# Patient Record
Sex: MALE | Race: WHITE | NOT HISPANIC OR LATINO | Employment: OTHER | URBAN - METROPOLITAN AREA
[De-identification: names, ages, dates, MRNs, and addresses within clinical notes are randomized per-mention and may not be internally consistent; named-entity substitution may affect disease eponyms.]

---

## 2017-01-09 ENCOUNTER — HISTORICAL (OUTPATIENT)
Dept: RADIOLOGY | Facility: HOSPITAL | Age: 57
End: 2017-01-09

## 2017-02-17 ENCOUNTER — HISTORICAL (OUTPATIENT)
Dept: RADIOLOGY | Facility: HOSPITAL | Age: 57
End: 2017-02-17

## 2017-03-17 ENCOUNTER — HISTORICAL (OUTPATIENT)
Dept: RADIOLOGY | Facility: HOSPITAL | Age: 57
End: 2017-03-17

## 2018-02-02 ENCOUNTER — HISTORICAL (OUTPATIENT)
Dept: RADIOLOGY | Facility: HOSPITAL | Age: 58
End: 2018-02-02

## 2020-08-05 ENCOUNTER — HISTORICAL (OUTPATIENT)
Dept: RADIOLOGY | Facility: HOSPITAL | Age: 60
End: 2020-08-05

## 2021-09-29 ENCOUNTER — HISTORICAL (OUTPATIENT)
Dept: RADIOLOGY | Facility: HOSPITAL | Age: 61
End: 2021-09-29

## 2021-10-04 ENCOUNTER — HISTORICAL (OUTPATIENT)
Dept: RADIOLOGY | Facility: HOSPITAL | Age: 61
End: 2021-10-04

## 2021-10-04 LAB — POC CREATININE: 1.2 MG/DL (ref 0.6–1.3)

## 2021-11-24 ENCOUNTER — HISTORICAL (OUTPATIENT)
Dept: RADIOLOGY | Facility: HOSPITAL | Age: 61
End: 2021-11-24

## 2022-01-01 ENCOUNTER — HOSPITAL ENCOUNTER (OUTPATIENT)
Dept: RADIOLOGY | Facility: HOSPITAL | Age: 62
Discharge: HOME | End: 2022-08-30
Attending: RADIOLOGY
Payer: MEDICARE

## 2022-01-01 ENCOUNTER — INFUSION (OUTPATIENT)
Dept: INFUSION THERAPY | Facility: HOSPITAL | Age: 62
End: 2022-01-01
Payer: MEDICARE

## 2022-01-01 ENCOUNTER — OFFICE VISIT (OUTPATIENT)
Dept: HEMATOLOGY/ONCOLOGY | Facility: CLINIC | Age: 62
End: 2022-01-01
Payer: MEDICARE

## 2022-01-01 ENCOUNTER — DOCUMENTATION ONLY (OUTPATIENT)
Dept: HEMATOLOGY/ONCOLOGY | Facility: CLINIC | Age: 62
End: 2022-01-01

## 2022-01-01 ENCOUNTER — HOSPITAL ENCOUNTER (EMERGENCY)
Facility: HOSPITAL | Age: 62
Discharge: HOME | End: 2022-09-29
Attending: EMERGENCY MEDICINE
Payer: MEDICARE

## 2022-01-01 ENCOUNTER — HOSPITAL ENCOUNTER (OUTPATIENT)
Dept: RADIOLOGY | Facility: HOSPITAL | Age: 62
Discharge: HOME | End: 2022-11-15
Attending: INTERNAL MEDICINE
Payer: MEDICARE

## 2022-01-01 ENCOUNTER — INFUSION (OUTPATIENT)
Dept: INFUSION THERAPY | Facility: HOSPITAL | Age: 62
End: 2022-01-01
Attending: INTERNAL MEDICINE
Payer: MEDICARE

## 2022-01-01 ENCOUNTER — TELEPHONE (OUTPATIENT)
Dept: HEMATOLOGY/ONCOLOGY | Facility: CLINIC | Age: 62
End: 2022-01-01
Payer: MEDICARE

## 2022-01-01 ENCOUNTER — OFFICE VISIT (OUTPATIENT)
Dept: RADIATION THERAPY | Facility: HOSPITAL | Age: 62
End: 2022-01-01
Attending: RADIOLOGY
Payer: MEDICARE

## 2022-01-01 ENCOUNTER — HOSPITAL ENCOUNTER (OUTPATIENT)
Dept: RADIOLOGY | Facility: HOSPITAL | Age: 62
Discharge: HOME | End: 2022-08-30
Attending: INTERNAL MEDICINE
Payer: MEDICARE

## 2022-01-01 ENCOUNTER — HOSPITAL ENCOUNTER (EMERGENCY)
Facility: HOSPITAL | Age: 62
Discharge: HOME | End: 2022-11-10
Attending: INTERNAL MEDICINE
Payer: MEDICARE

## 2022-01-01 ENCOUNTER — TELEPHONE (OUTPATIENT)
Dept: INTERVENTIONAL RADIOLOGY/VASCULAR | Facility: HOSPITAL | Age: 62
End: 2022-01-01
Payer: MEDICARE

## 2022-01-01 VITALS
HEIGHT: 71 IN | HEART RATE: 53 BPM | BODY MASS INDEX: 27.34 KG/M2 | TEMPERATURE: 98 F | WEIGHT: 190.94 LBS | SYSTOLIC BLOOD PRESSURE: 123 MMHG | RESPIRATION RATE: 18 BRPM | BODY MASS INDEX: 26.48 KG/M2 | HEIGHT: 70 IN | SYSTOLIC BLOOD PRESSURE: 148 MMHG | TEMPERATURE: 97 F | OXYGEN SATURATION: 93 % | OXYGEN SATURATION: 94 % | DIASTOLIC BLOOD PRESSURE: 74 MMHG | DIASTOLIC BLOOD PRESSURE: 62 MMHG | RESPIRATION RATE: 18 BRPM | WEIGHT: 189.13 LBS | HEART RATE: 53 BPM

## 2022-01-01 VITALS
DIASTOLIC BLOOD PRESSURE: 67 MMHG | TEMPERATURE: 99 F | TEMPERATURE: 98 F | OXYGEN SATURATION: 95 % | WEIGHT: 182 LBS | HEART RATE: 58 BPM | RESPIRATION RATE: 18 BRPM | WEIGHT: 192 LBS | BODY MASS INDEX: 26.11 KG/M2 | SYSTOLIC BLOOD PRESSURE: 138 MMHG | DIASTOLIC BLOOD PRESSURE: 65 MMHG | HEIGHT: 71 IN | BODY MASS INDEX: 26.88 KG/M2 | SYSTOLIC BLOOD PRESSURE: 112 MMHG | OXYGEN SATURATION: 95 % | RESPIRATION RATE: 16 BRPM | HEART RATE: 58 BPM

## 2022-01-01 VITALS
DIASTOLIC BLOOD PRESSURE: 61 MMHG | TEMPERATURE: 98 F | OXYGEN SATURATION: 93 % | BODY MASS INDEX: 27.34 KG/M2 | SYSTOLIC BLOOD PRESSURE: 123 MMHG | RESPIRATION RATE: 18 BRPM | HEIGHT: 70 IN | WEIGHT: 190.94 LBS | HEART RATE: 55 BPM

## 2022-01-01 VITALS
WEIGHT: 187 LBS | RESPIRATION RATE: 18 BRPM | OXYGEN SATURATION: 96 % | BODY MASS INDEX: 26.77 KG/M2 | TEMPERATURE: 98 F | DIASTOLIC BLOOD PRESSURE: 60 MMHG | SYSTOLIC BLOOD PRESSURE: 107 MMHG | HEIGHT: 70 IN | HEART RATE: 67 BPM

## 2022-01-01 VITALS
HEIGHT: 70 IN | DIASTOLIC BLOOD PRESSURE: 77 MMHG | HEART RATE: 54 BPM | SYSTOLIC BLOOD PRESSURE: 173 MMHG | TEMPERATURE: 98 F | WEIGHT: 185.19 LBS | OXYGEN SATURATION: 97 % | BODY MASS INDEX: 26.51 KG/M2 | RESPIRATION RATE: 23 BRPM

## 2022-01-01 VITALS
BODY MASS INDEX: 26.65 KG/M2 | HEIGHT: 70 IN | OXYGEN SATURATION: 95 % | WEIGHT: 185.63 LBS | RESPIRATION RATE: 18 BRPM | DIASTOLIC BLOOD PRESSURE: 80 MMHG | OXYGEN SATURATION: 95 % | BODY MASS INDEX: 26.57 KG/M2 | HEART RATE: 65 BPM | SYSTOLIC BLOOD PRESSURE: 133 MMHG | SYSTOLIC BLOOD PRESSURE: 133 MMHG | HEART RATE: 65 BPM | TEMPERATURE: 98 F | DIASTOLIC BLOOD PRESSURE: 80 MMHG | WEIGHT: 185.69 LBS | RESPIRATION RATE: 18 BRPM

## 2022-01-01 VITALS
DIASTOLIC BLOOD PRESSURE: 79 MMHG | HEART RATE: 68 BPM | RESPIRATION RATE: 18 BRPM | TEMPERATURE: 98 F | BODY MASS INDEX: 25.83 KG/M2 | OXYGEN SATURATION: 96 % | DIASTOLIC BLOOD PRESSURE: 76 MMHG | RESPIRATION RATE: 16 BRPM | HEIGHT: 70 IN | WEIGHT: 187 LBS | OXYGEN SATURATION: 98 % | SYSTOLIC BLOOD PRESSURE: 154 MMHG | SYSTOLIC BLOOD PRESSURE: 122 MMHG | TEMPERATURE: 99 F | WEIGHT: 180 LBS | BODY MASS INDEX: 26.77 KG/M2 | HEART RATE: 73 BPM

## 2022-01-01 VITALS
BODY MASS INDEX: 26.94 KG/M2 | OXYGEN SATURATION: 99 % | SYSTOLIC BLOOD PRESSURE: 133 MMHG | WEIGHT: 188.19 LBS | OXYGEN SATURATION: 97 % | HEART RATE: 60 BPM | TEMPERATURE: 98 F | RESPIRATION RATE: 18 BRPM | RESPIRATION RATE: 18 BRPM | HEART RATE: 83 BPM | DIASTOLIC BLOOD PRESSURE: 74 MMHG | WEIGHT: 187 LBS | HEIGHT: 70 IN | TEMPERATURE: 98 F | DIASTOLIC BLOOD PRESSURE: 77 MMHG | SYSTOLIC BLOOD PRESSURE: 141 MMHG | HEIGHT: 70 IN | BODY MASS INDEX: 26.77 KG/M2

## 2022-01-01 VITALS
HEIGHT: 70 IN | HEART RATE: 81 BPM | DIASTOLIC BLOOD PRESSURE: 77 MMHG | DIASTOLIC BLOOD PRESSURE: 67 MMHG | OXYGEN SATURATION: 99 % | BODY MASS INDEX: 26.34 KG/M2 | RESPIRATION RATE: 18 BRPM | HEART RATE: 83 BPM | HEIGHT: 70 IN | SYSTOLIC BLOOD PRESSURE: 147 MMHG | BODY MASS INDEX: 26.94 KG/M2 | WEIGHT: 184 LBS | TEMPERATURE: 98 F | RESPIRATION RATE: 18 BRPM | SYSTOLIC BLOOD PRESSURE: 114 MMHG | WEIGHT: 188.19 LBS | OXYGEN SATURATION: 97 % | TEMPERATURE: 98 F

## 2022-01-01 VITALS
TEMPERATURE: 98 F | OXYGEN SATURATION: 96 % | WEIGHT: 183.56 LBS | HEIGHT: 70 IN | BODY MASS INDEX: 26.28 KG/M2 | SYSTOLIC BLOOD PRESSURE: 114 MMHG | HEART RATE: 60 BPM | RESPIRATION RATE: 20 BRPM | DIASTOLIC BLOOD PRESSURE: 71 MMHG

## 2022-01-01 VITALS
DIASTOLIC BLOOD PRESSURE: 62 MMHG | BODY MASS INDEX: 27.18 KG/M2 | HEART RATE: 59 BPM | HEIGHT: 70 IN | SYSTOLIC BLOOD PRESSURE: 147 MMHG | RESPIRATION RATE: 18 BRPM | TEMPERATURE: 97 F | OXYGEN SATURATION: 97 % | WEIGHT: 189.88 LBS

## 2022-01-01 VITALS
HEIGHT: 70 IN | SYSTOLIC BLOOD PRESSURE: 164 MMHG | WEIGHT: 175 LBS | HEART RATE: 77 BPM | RESPIRATION RATE: 20 BRPM | OXYGEN SATURATION: 94 % | DIASTOLIC BLOOD PRESSURE: 84 MMHG | TEMPERATURE: 98 F | BODY MASS INDEX: 25.05 KG/M2

## 2022-01-01 DIAGNOSIS — E11.621 TYPE 2 DIABETES MELLITUS WITH DIABETIC TOE ULCER: ICD-10-CM

## 2022-01-01 DIAGNOSIS — C34.91 ADENOCARCINOMA, LUNG, RIGHT: Primary | ICD-10-CM

## 2022-01-01 DIAGNOSIS — E83.42 HYPOMAGNESEMIA: Primary | ICD-10-CM

## 2022-01-01 DIAGNOSIS — Z51.11 ENCOUNTER FOR ANTINEOPLASTIC CHEMOTHERAPY: Primary | ICD-10-CM

## 2022-01-01 DIAGNOSIS — R55 SYNCOPE: ICD-10-CM

## 2022-01-01 DIAGNOSIS — E87.5 HYPERKALEMIA: Primary | ICD-10-CM

## 2022-01-01 DIAGNOSIS — C34.91 ADENOCARCINOMA, LUNG, RIGHT: ICD-10-CM

## 2022-01-01 DIAGNOSIS — R91.1 LUNG NODULE: ICD-10-CM

## 2022-01-01 DIAGNOSIS — R63.0 DECREASE IN APPETITE: ICD-10-CM

## 2022-01-01 DIAGNOSIS — C34.90 MALIGNANT NEOPLASM OF LUNG, UNSPECIFIED LATERALITY, UNSPECIFIED PART OF LUNG: ICD-10-CM

## 2022-01-01 DIAGNOSIS — J44.9 CHRONIC OBSTRUCTIVE PULMONARY DISEASE, UNSPECIFIED COPD TYPE: ICD-10-CM

## 2022-01-01 DIAGNOSIS — Z51.11 ENCOUNTER FOR ANTINEOPLASTIC CHEMOTHERAPY: ICD-10-CM

## 2022-01-01 DIAGNOSIS — Z86.79 HISTORY OF HYPERTENSION: ICD-10-CM

## 2022-01-01 DIAGNOSIS — E83.42 HYPOMAGNESEMIA: ICD-10-CM

## 2022-01-01 DIAGNOSIS — K13.79 MOUTH SORE SECONDARY TO CHEMOTHERAPY: ICD-10-CM

## 2022-01-01 DIAGNOSIS — L97.509 TYPE 2 DIABETES MELLITUS WITH DIABETIC TOE ULCER: ICD-10-CM

## 2022-01-01 DIAGNOSIS — R63.4 ABNORMAL WEIGHT LOSS: ICD-10-CM

## 2022-01-01 DIAGNOSIS — S09.90XA TRAUMATIC INJURY OF HEAD, INITIAL ENCOUNTER: Primary | ICD-10-CM

## 2022-01-01 DIAGNOSIS — T45.1X5A MOUTH SORE SECONDARY TO CHEMOTHERAPY: ICD-10-CM

## 2022-01-01 DIAGNOSIS — R53.1 GENERALIZED WEAKNESS: Primary | ICD-10-CM

## 2022-01-01 DIAGNOSIS — E10.69 TYPE 1 DIABETES MELLITUS WITH OTHER SPECIFIED COMPLICATION: ICD-10-CM

## 2022-01-01 DIAGNOSIS — E87.5 HYPERKALEMIA: ICD-10-CM

## 2022-01-01 DIAGNOSIS — R63.4 ABNORMAL WEIGHT LOSS: Primary | ICD-10-CM

## 2022-01-01 DIAGNOSIS — R42 DIZZINESS AND GIDDINESS: ICD-10-CM

## 2022-01-01 LAB
ALBUMIN SERPL-MCNC: 4 GM/DL (ref 3.4–4.8)
ALBUMIN/GLOB SERPL: 1.5 RATIO (ref 1.1–2)
ALP SERPL-CCNC: 74 UNIT/L (ref 40–150)
ALT SERPL-CCNC: 11 UNIT/L (ref 0–55)
AMPHET UR QL SCN: NEGATIVE
APPEARANCE UR: CLEAR
AST SERPL-CCNC: 14 UNIT/L (ref 5–34)
BACTERIA #/AREA URNS AUTO: NORMAL /HPF
BARBITURATE SCN PRESENT UR: NEGATIVE
BASOPHILS # BLD AUTO: 0.11 X10(3)/MCL (ref 0–0.2)
BASOPHILS # BLD AUTO: 0.12 X10(3)/MCL (ref 0–0.2)
BASOPHILS NFR BLD AUTO: 1.1 %
BASOPHILS NFR BLD AUTO: 1.4 %
BENZODIAZ UR QL SCN: NEGATIVE
BILIRUB UR QL STRIP.AUTO: NEGATIVE MG/DL
BILIRUBIN DIRECT+TOT PNL SERPL-MCNC: 0.5 MG/DL
BUN SERPL-MCNC: 13 MG/DL (ref 8.4–25.7)
CALCIUM SERPL-MCNC: 9.1 MG/DL (ref 8.8–10)
CANNABINOIDS UR QL SCN: POSITIVE
CHLORIDE SERPL-SCNC: 103 MMOL/L (ref 98–107)
CO2 SERPL-SCNC: 31 MMOL/L (ref 23–31)
COCAINE UR QL SCN: NEGATIVE
COLOR UR AUTO: YELLOW
CREAT SERPL-MCNC: 1.46 MG/DL (ref 0.73–1.18)
DHEA SERPL-MCNC: NORMAL
EOSINOPHIL # BLD AUTO: 0.21 X10(3)/MCL (ref 0–0.9)
EOSINOPHIL # BLD AUTO: 0.3 X10(3)/MCL (ref 0–0.9)
EOSINOPHIL NFR BLD AUTO: 2.4 %
EOSINOPHIL NFR BLD AUTO: 2.9 %
ERYTHROCYTE [DISTWIDTH] IN BLOOD BY AUTOMATED COUNT: 13.4 % (ref 11.5–17)
ERYTHROCYTE [DISTWIDTH] IN BLOOD BY AUTOMATED COUNT: 13.5 % (ref 11.5–17)
ESTRIOL SERPL-MCNC: NORMAL NG/ML
ESTROGEN SERPL-MCNC: NORMAL PG/ML
ETHANOL SERPL-MCNC: <10 MG/DL
FENTANYL UR QL SCN: POSITIVE
FLUAV AG UPPER RESP QL IA.RAPID: NOT DETECTED
FLUBV AG UPPER RESP QL IA.RAPID: NOT DETECTED
GFR SERPLBLD CREATININE-BSD FMLA CKD-EPI: 53 MLS/MIN/1.73/M2
GLOBULIN SER-MCNC: 2.7 GM/DL (ref 2.4–3.5)
GLUCOSE SERPL-MCNC: 270 MG/DL (ref 70–110)
GLUCOSE SERPL-MCNC: 441 MG/DL (ref 82–115)
GLUCOSE UR QL STRIP.AUTO: NEGATIVE MG/DL
HAV IGM SERPL QL IA: NONREACTIVE
HBV CORE IGM SERPL QL IA: NONREACTIVE
HBV SURFACE AG SERPL QL IA: NONREACTIVE
HCT VFR BLD AUTO: 46.6 % (ref 42–52)
HCT VFR BLD AUTO: 50.4 % (ref 42–52)
HCV AB SERPL QL IA: NONREACTIVE
HGB BLD-MCNC: 14.4 GM/DL (ref 14–18)
HGB BLD-MCNC: 15.5 GM/DL (ref 14–18)
IMM GRANULOCYTES # BLD AUTO: 0.02 X10(3)/MCL (ref 0–0.04)
IMM GRANULOCYTES # BLD AUTO: 0.02 X10(3)/MCL (ref 0–0.04)
IMM GRANULOCYTES NFR BLD AUTO: 0.2 %
IMM GRANULOCYTES NFR BLD AUTO: 0.2 %
INR BLD: 1 (ref 0–1.3)
INSULIN SERPL-ACNC: NORMAL U[IU]/ML
KETONES UR QL STRIP.AUTO: NEGATIVE MG/DL
LAB AP CLINICAL INFORMATION: NORMAL
LAB AP GROSS DESCRIPTION: NORMAL
LAB AP REPORT FOOTNOTES: NORMAL
LEUKOCYTE ESTERASE UR QL STRIP.AUTO: NEGATIVE UNIT/L
LYMPHOCYTES # BLD AUTO: 2.74 X10(3)/MCL (ref 0.6–4.6)
LYMPHOCYTES # BLD AUTO: 3.27 X10(3)/MCL (ref 0.6–4.6)
LYMPHOCYTES NFR BLD AUTO: 26.3 %
LYMPHOCYTES NFR BLD AUTO: 37.3 %
MAGNESIUM SERPL-MCNC: 1.9 MG/DL (ref 1.6–2.6)
MCH RBC QN AUTO: 27.4 PG (ref 27–31)
MCH RBC QN AUTO: 27.5 PG (ref 27–31)
MCHC RBC AUTO-ENTMCNC: 30.8 MG/DL (ref 33–36)
MCHC RBC AUTO-ENTMCNC: 30.9 MG/DL (ref 33–36)
MCV RBC AUTO: 88.8 FL (ref 80–94)
MCV RBC AUTO: 89.4 FL (ref 80–94)
MDMA UR QL SCN: NEGATIVE
MONOCYTES # BLD AUTO: 0.75 X10(3)/MCL (ref 0.1–1.3)
MONOCYTES # BLD AUTO: 0.89 X10(3)/MCL (ref 0.1–1.3)
MONOCYTES NFR BLD AUTO: 8.6 %
MONOCYTES NFR BLD AUTO: 8.6 %
NEUTROPHILS # BLD AUTO: 4.4 X10(3)/MCL (ref 2.1–9.2)
NEUTROPHILS # BLD AUTO: 6.3 X10(3)/MCL (ref 2.1–9.2)
NEUTROPHILS NFR BLD AUTO: 50.1 %
NEUTROPHILS NFR BLD AUTO: 60.9 %
NITRITE UR QL STRIP.AUTO: NEGATIVE
NRBC BLD AUTO-RTO: 0 %
OPIATES UR QL SCN: POSITIVE
PATH REV: NORMAL
PCP UR QL: NEGATIVE
PH UR STRIP.AUTO: 6.5 [PH]
PH UR: 6.5 [PH] (ref 3–11)
PLATELET # BLD AUTO: 151 X10(3)/MCL (ref 130–400)
PLATELET # BLD AUTO: 220 X10(3)/MCL (ref 130–400)
PMV BLD AUTO: 11.3 FL (ref 7.4–10.4)
PMV BLD AUTO: 11.4 FL (ref 7.4–10.4)
POCT GLUCOSE: 232 MG/DL (ref 70–110)
POCT GLUCOSE: 270 MG/DL (ref 70–110)
POTASSIUM SERPL-SCNC: 5 MMOL/L (ref 3.5–5.1)
PROT SERPL-MCNC: 6.7 GM/DL (ref 5.8–7.6)
PROT UR QL STRIP.AUTO: NEGATIVE MG/DL
PROTHROMBIN TIME: 13.1 SECONDS (ref 12.5–14.5)
RBC # BLD AUTO: 5.25 X10(6)/MCL (ref 4.7–6.1)
RBC # BLD AUTO: 5.64 X10(6)/MCL (ref 4.7–6.1)
RBC #/AREA URNS AUTO: NORMAL /HPF
RBC UR QL AUTO: ABNORMAL UNIT/L
SARS-COV-2 RNA RESP QL NAA+PROBE: NOT DETECTED
SODIUM SERPL-SCNC: 137 MMOL/L (ref 136–145)
SP GR UR STRIP.AUTO: 1.01
SPECIFIC GRAVITY, URINE AUTO (.000) (OHS): 1.01 (ref 1–1.03)
SQUAMOUS #/AREA URNS AUTO: NORMAL /HPF
T3RU NFR SERPL: NORMAL %
TROPONIN I SERPL-MCNC: 0.01 NG/ML (ref 0–0.04)
TSH SERPL-ACNC: 0.67 UIU/ML (ref 0.35–4.94)
UROBILINOGEN UR STRIP-ACNC: 1 MG/DL
WBC # SPEC AUTO: 10.4 X10(3)/MCL (ref 4.5–11.5)
WBC # SPEC AUTO: 8.8 X10(3)/MCL (ref 4.5–11.5)
WBC #/AREA URNS AUTO: NORMAL /HPF

## 2022-01-01 PROCEDURE — 99999 PR PBB SHADOW E&M-EST. PATIENT-LVL V: ICD-10-PCS | Mod: PBBFAC,,, | Performed by: INTERNAL MEDICINE

## 2022-01-01 PROCEDURE — 85610 PROTHROMBIN TIME: CPT | Performed by: RADIOLOGY

## 2022-01-01 PROCEDURE — 99999 PR PBB SHADOW E&M-EST. PATIENT-LVL V: CPT | Mod: PBBFAC,,, | Performed by: INTERNAL MEDICINE

## 2022-01-01 PROCEDURE — 99999 PR PBB SHADOW E&M-EST. PATIENT-LVL V: ICD-10-PCS | Mod: PBBFAC,,, | Performed by: NURSE PRACTITIONER

## 2022-01-01 PROCEDURE — 25000003 PHARM REV CODE 250: Performed by: EMERGENCY MEDICINE

## 2022-01-01 PROCEDURE — 99214 OFFICE O/P EST MOD 30 MIN: CPT | Mod: S$PBB,,, | Performed by: NURSE PRACTITIONER

## 2022-01-01 PROCEDURE — 77386 HC IMRT, COMPLEX: CPT | Performed by: RADIOLOGY

## 2022-01-01 PROCEDURE — 99215 PR OFFICE/OUTPT VISIT, EST, LEVL V, 40-54 MIN: ICD-10-PCS | Mod: S$PBB,,, | Performed by: INTERNAL MEDICINE

## 2022-01-01 PROCEDURE — 99215 OFFICE O/P EST HI 40 MIN: CPT | Mod: PBBFAC | Performed by: INTERNAL MEDICINE

## 2022-01-01 PROCEDURE — 99215 OFFICE O/P EST HI 40 MIN: CPT | Mod: S$PBB,,, | Performed by: INTERNAL MEDICINE

## 2022-01-01 PROCEDURE — 96368 THER/DIAG CONCURRENT INF: CPT

## 2022-01-01 PROCEDURE — 63600175 PHARM REV CODE 636 W HCPCS: Performed by: INTERNAL MEDICINE

## 2022-01-01 PROCEDURE — 99285 EMERGENCY DEPT VISIT HI MDM: CPT | Mod: 25,27

## 2022-01-01 PROCEDURE — 96367 TX/PROPH/DG ADDL SEQ IV INF: CPT

## 2022-01-01 PROCEDURE — 36415 COLL VENOUS BLD VENIPUNCTURE: CPT | Performed by: RADIOLOGY

## 2022-01-01 PROCEDURE — 96360 HYDRATION IV INFUSION INIT: CPT

## 2022-01-01 PROCEDURE — 99213 OFFICE O/P EST LOW 20 MIN: CPT | Mod: PBBFAC,25 | Performed by: INTERNAL MEDICINE

## 2022-01-01 PROCEDURE — 99214 OFFICE O/P EST MOD 30 MIN: CPT | Mod: S$PBB,,, | Performed by: INTERNAL MEDICINE

## 2022-01-01 PROCEDURE — 96375 TX/PRO/DX INJ NEW DRUG ADDON: CPT

## 2022-01-01 PROCEDURE — 77334 RADIATION TREATMENT AID(S): CPT | Performed by: RADIOLOGY

## 2022-01-01 PROCEDURE — 77300 RADIATION THERAPY DOSE PLAN: CPT | Performed by: RADIOLOGY

## 2022-01-01 PROCEDURE — 71045 X-RAY EXAM CHEST 1 VIEW: CPT | Mod: TC

## 2022-01-01 PROCEDURE — 99214 OFFICE O/P EST MOD 30 MIN: CPT | Mod: PBBFAC | Performed by: INTERNAL MEDICINE

## 2022-01-01 PROCEDURE — 77301 RADIOTHERAPY DOSE PLAN IMRT: CPT | Performed by: RADIOLOGY

## 2022-01-01 PROCEDURE — 96411 CHEMO IV PUSH ADDL DRUG: CPT

## 2022-01-01 PROCEDURE — 80074 ACUTE HEPATITIS PANEL: CPT | Performed by: INTERNAL MEDICINE

## 2022-01-01 PROCEDURE — 25000003 PHARM REV CODE 250: Performed by: RADIOLOGY

## 2022-01-01 PROCEDURE — 99214 PR OFFICE/OUTPT VISIT, EST, LEVL IV, 30-39 MIN: ICD-10-PCS | Mod: S$PBB,,, | Performed by: INTERNAL MEDICINE

## 2022-01-01 PROCEDURE — 99204 PR OFFICE/OUTPT VISIT, NEW, LEVL IV, 45-59 MIN: ICD-10-PCS | Mod: S$PBB,,, | Performed by: INTERNAL MEDICINE

## 2022-01-01 PROCEDURE — 99215 OFFICE O/P EST HI 40 MIN: CPT | Mod: PBBFAC,25 | Performed by: NURSE PRACTITIONER

## 2022-01-01 PROCEDURE — 83735 ASSAY OF MAGNESIUM: CPT | Performed by: INTERNAL MEDICINE

## 2022-01-01 PROCEDURE — 84484 ASSAY OF TROPONIN QUANT: CPT | Performed by: INTERNAL MEDICINE

## 2022-01-01 PROCEDURE — 25000003 PHARM REV CODE 250: Performed by: INTERNAL MEDICINE

## 2022-01-01 PROCEDURE — 96413 CHEMO IV INFUSION 1 HR: CPT

## 2022-01-01 PROCEDURE — 96374 THER/PROPH/DIAG INJ IV PUSH: CPT

## 2022-01-01 PROCEDURE — 99284 EMERGENCY DEPT VISIT MOD MDM: CPT | Mod: 25,27

## 2022-01-01 PROCEDURE — 99213 OFFICE O/P EST LOW 20 MIN: CPT | Mod: S$PBB,,, | Performed by: INTERNAL MEDICINE

## 2022-01-01 PROCEDURE — 99215 OFFICE O/P EST HI 40 MIN: CPT | Mod: PBBFAC,25 | Performed by: INTERNAL MEDICINE

## 2022-01-01 PROCEDURE — 96417 CHEMO IV INFUS EACH ADDL SEQ: CPT

## 2022-01-01 PROCEDURE — 36415 COLL VENOUS BLD VENIPUNCTURE: CPT | Performed by: INTERNAL MEDICINE

## 2022-01-01 PROCEDURE — 63600175 PHARM REV CODE 636 W HCPCS: Performed by: RADIOLOGY

## 2022-01-01 PROCEDURE — 99999 PR PBB SHADOW E&M-EST. PATIENT-LVL V: CPT | Mod: PBBFAC,,, | Performed by: NURSE PRACTITIONER

## 2022-01-01 PROCEDURE — 81003 URINALYSIS AUTO W/O SCOPE: CPT | Performed by: INTERNAL MEDICINE

## 2022-01-01 PROCEDURE — 99999 PR PBB SHADOW E&M-EST. PATIENT-LVL IV: ICD-10-PCS | Mod: PBBFAC,,, | Performed by: INTERNAL MEDICINE

## 2022-01-01 PROCEDURE — 0240U COVID/FLU A&B PCR: CPT | Performed by: INTERNAL MEDICINE

## 2022-01-01 PROCEDURE — 77336 RADIATION PHYSICS CONSULT: CPT | Performed by: RADIOLOGY

## 2022-01-01 PROCEDURE — 80307 DRUG TEST PRSMV CHEM ANLYZR: CPT | Performed by: INTERNAL MEDICINE

## 2022-01-01 PROCEDURE — 63600175 PHARM REV CODE 636 W HCPCS: Performed by: EMERGENCY MEDICINE

## 2022-01-01 PROCEDURE — 32408 CORE NDL BX LNG/MED PERQ: CPT

## 2022-01-01 PROCEDURE — 96361 HYDRATE IV INFUSION ADD-ON: CPT

## 2022-01-01 PROCEDURE — 63600175 PHARM REV CODE 636 W HCPCS: Mod: JG | Performed by: INTERNAL MEDICINE

## 2022-01-01 PROCEDURE — 85025 COMPLETE CBC W/AUTO DIFF WBC: CPT | Performed by: INTERNAL MEDICINE

## 2022-01-01 PROCEDURE — 85025 COMPLETE CBC W/AUTO DIFF WBC: CPT | Performed by: RADIOLOGY

## 2022-01-01 PROCEDURE — 84075 ASSAY ALKALINE PHOSPHATASE: CPT | Performed by: INTERNAL MEDICINE

## 2022-01-01 PROCEDURE — 93005 ELECTROCARDIOGRAM TRACING: CPT

## 2022-01-01 PROCEDURE — 93010 EKG 12-LEAD: ICD-10-PCS | Mod: ,,, | Performed by: INTERNAL MEDICINE

## 2022-01-01 PROCEDURE — 93010 ELECTROCARDIOGRAM REPORT: CPT | Mod: ,,, | Performed by: INTERNAL MEDICINE

## 2022-01-01 PROCEDURE — 99999 PR PBB SHADOW E&M-EST. PATIENT-LVL III: CPT | Mod: PBBFAC,,, | Performed by: INTERNAL MEDICINE

## 2022-01-01 PROCEDURE — 99215 OFFICE O/P EST HI 40 MIN: CPT | Mod: PBBFAC | Performed by: NURSE PRACTITIONER

## 2022-01-01 PROCEDURE — 99999 PR PBB SHADOW E&M-EST. PATIENT-LVL IV: CPT | Mod: PBBFAC,,, | Performed by: INTERNAL MEDICINE

## 2022-01-01 PROCEDURE — 99204 OFFICE O/P NEW MOD 45 MIN: CPT | Mod: S$PBB,,, | Performed by: INTERNAL MEDICINE

## 2022-01-01 PROCEDURE — 99215 PR OFFICE/OUTPT VISIT, EST, LEVL V, 40-54 MIN: ICD-10-PCS | Mod: S$PBB,,, | Performed by: NURSE PRACTITIONER

## 2022-01-01 PROCEDURE — 99999 PR PBB SHADOW E&M-EST. PATIENT-LVL III: ICD-10-PCS | Mod: PBBFAC,,, | Performed by: INTERNAL MEDICINE

## 2022-01-01 PROCEDURE — 99215 OFFICE O/P EST HI 40 MIN: CPT | Mod: S$PBB,,, | Performed by: NURSE PRACTITIONER

## 2022-01-01 PROCEDURE — 82962 GLUCOSE BLOOD TEST: CPT

## 2022-01-01 PROCEDURE — 99213 PR OFFICE/OUTPT VISIT, EST, LEVL III, 20-29 MIN: ICD-10-PCS | Mod: S$PBB,,, | Performed by: INTERNAL MEDICINE

## 2022-01-01 PROCEDURE — 25000003 PHARM REV CODE 250: Performed by: NURSE PRACTITIONER

## 2022-01-01 PROCEDURE — 99214 PR OFFICE/OUTPT VISIT, EST, LEVL IV, 30-39 MIN: ICD-10-PCS | Mod: S$PBB,,, | Performed by: NURSE PRACTITIONER

## 2022-01-01 PROCEDURE — 77338 DESIGN MLC DEVICE FOR IMRT: CPT | Performed by: RADIOLOGY

## 2022-01-01 PROCEDURE — 96372 THER/PROPH/DIAG INJ SC/IM: CPT | Mod: 59

## 2022-01-01 PROCEDURE — 82077 ASSAY SPEC XCP UR&BREATH IA: CPT | Performed by: INTERNAL MEDICINE

## 2022-01-01 PROCEDURE — 81001 URINALYSIS AUTO W/SCOPE: CPT | Performed by: INTERNAL MEDICINE

## 2022-01-01 PROCEDURE — 84443 ASSAY THYROID STIM HORMONE: CPT | Performed by: INTERNAL MEDICINE

## 2022-01-01 PROCEDURE — 63600175 PHARM REV CODE 636 W HCPCS: Performed by: NURSE PRACTITIONER

## 2022-01-01 RX ORDER — PROMETHAZINE HYDROCHLORIDE 25 MG/1
25 TABLET ORAL EVERY 6 HOURS PRN
Qty: 45 TABLET | Refills: 3 | Status: SHIPPED | OUTPATIENT
Start: 2022-01-01

## 2022-01-01 RX ORDER — HEPARIN 100 UNIT/ML
500 SYRINGE INTRAVENOUS
Status: CANCELLED | OUTPATIENT
Start: 2022-01-01

## 2022-01-01 RX ORDER — MIDAZOLAM HYDROCHLORIDE 1 MG/ML
INJECTION INTRAMUSCULAR; INTRAVENOUS
Status: DISCONTINUED
Start: 2022-01-01 | End: 2022-01-01 | Stop reason: HOSPADM

## 2022-01-01 RX ORDER — SODIUM BICARBONATE 1 MEQ/ML
50 SYRINGE (ML) INTRAVENOUS
Status: COMPLETED | OUTPATIENT
Start: 2022-01-01 | End: 2022-01-01

## 2022-01-01 RX ORDER — CYANOCOBALAMIN 1000 UG/ML
1000 INJECTION, SOLUTION INTRAMUSCULAR; SUBCUTANEOUS
Status: CANCELLED | OUTPATIENT
Start: 2022-01-01

## 2022-01-01 RX ORDER — CYANOCOBALAMIN 1000 UG/ML
1000 INJECTION, SOLUTION INTRAMUSCULAR; SUBCUTANEOUS
Status: CANCELLED
Start: 2022-01-01

## 2022-01-01 RX ORDER — FOLIC ACID 1 MG/1
1 TABLET ORAL DAILY
Qty: 100 TABLET | Refills: 3 | Status: SHIPPED | OUTPATIENT
Start: 2022-01-01 | End: 2023-09-20

## 2022-01-01 RX ORDER — SODIUM CHLORIDE 0.9 % (FLUSH) 0.9 %
10 SYRINGE (ML) INJECTION
Status: CANCELLED | OUTPATIENT
Start: 2022-01-01

## 2022-01-01 RX ORDER — CYANOCOBALAMIN 1000 UG/ML
1000 INJECTION, SOLUTION INTRAMUSCULAR; SUBCUTANEOUS
Status: COMPLETED | OUTPATIENT
Start: 2022-01-01 | End: 2022-01-01

## 2022-01-01 RX ORDER — LIDOCAINE HYDROCHLORIDE 20 MG/ML
INJECTION, SOLUTION INFILTRATION; PERINEURAL
Status: DISCONTINUED | OUTPATIENT
Start: 2022-01-01 | End: 2022-01-01 | Stop reason: HOSPADM

## 2022-01-01 RX ORDER — HEPARIN 100 UNIT/ML
500 SYRINGE INTRAVENOUS
Status: CANCELLED
Start: 2022-01-01

## 2022-01-01 RX ORDER — SODIUM CHLORIDE 0.9 % (FLUSH) 0.9 %
10 SYRINGE (ML) INJECTION
Status: DISCONTINUED | OUTPATIENT
Start: 2022-01-01 | End: 2023-01-01

## 2022-01-01 RX ORDER — HEPARIN 100 UNIT/ML
500 SYRINGE INTRAVENOUS
Status: DISCONTINUED | OUTPATIENT
Start: 2022-01-01 | End: 2023-01-01

## 2022-01-01 RX ORDER — FLUTICASONE FUROATE AND VILANTEROL 100; 25 UG/1; UG/1
1 POWDER RESPIRATORY (INHALATION) DAILY PRN
COMMUNITY
Start: 2022-03-11

## 2022-01-01 RX ORDER — MORPHINE SULFATE 30 MG/1
TABLET, FILM COATED, EXTENDED RELEASE ORAL
COMMUNITY
Start: 2022-01-01 | End: 2022-01-01

## 2022-01-01 RX ORDER — FERROUS SULFATE, DRIED 160(50) MG
1 TABLET, EXTENDED RELEASE ORAL 2 TIMES DAILY
Qty: 180 TABLET | Refills: 3 | Status: SHIPPED | OUTPATIENT
Start: 2022-01-01 | End: 2023-11-01

## 2022-01-01 RX ORDER — FENTANYL CITRATE 50 UG/ML
INJECTION, SOLUTION INTRAMUSCULAR; INTRAVENOUS
Status: DISCONTINUED
Start: 2022-01-01 | End: 2022-01-01 | Stop reason: HOSPADM

## 2022-01-01 RX ORDER — IBUPROFEN 200 MG
1 TABLET ORAL DAILY
Qty: 30 PATCH | Refills: 2 | Status: SHIPPED | OUTPATIENT
Start: 2022-01-01 | End: 2022-01-01

## 2022-01-01 RX ORDER — FENTANYL CITRATE 50 UG/ML
INJECTION, SOLUTION INTRAMUSCULAR; INTRAVENOUS
Status: DISCONTINUED | OUTPATIENT
Start: 2022-01-01 | End: 2022-01-01 | Stop reason: HOSPADM

## 2022-01-01 RX ORDER — ONDANSETRON 4 MG/1
8 TABLET, ORALLY DISINTEGRATING ORAL EVERY 12 HOURS PRN
Qty: 45 TABLET | Refills: 3 | Status: SHIPPED | OUTPATIENT
Start: 2022-01-01 | End: 2023-01-01 | Stop reason: SDUPTHER

## 2022-01-01 RX ORDER — LIDOCAINE HYDROCHLORIDE 20 MG/ML
INJECTION, SOLUTION EPIDURAL; INFILTRATION; INTRACAUDAL; PERINEURAL
Status: DISCONTINUED
Start: 2022-01-01 | End: 2022-01-01 | Stop reason: HOSPADM

## 2022-01-01 RX ORDER — MIDAZOLAM HYDROCHLORIDE 1 MG/ML
INJECTION INTRAMUSCULAR; INTRAVENOUS
Status: DISCONTINUED | OUTPATIENT
Start: 2022-01-01 | End: 2022-01-01 | Stop reason: HOSPADM

## 2022-01-01 RX ORDER — OXYCODONE AND ACETAMINOPHEN 10; 325 MG/1; MG/1
1 TABLET ORAL EVERY 6 HOURS PRN
COMMUNITY
Start: 2022-01-01

## 2022-01-01 RX ORDER — ASPIRIN 81 MG/1
81 TABLET ORAL DAILY
COMMUNITY

## 2022-01-01 RX ORDER — NALOXONE HYDROCHLORIDE 4 MG/.1ML
SPRAY NASAL
COMMUNITY
Start: 2022-01-01

## 2022-01-01 RX ORDER — PANTOPRAZOLE SODIUM 40 MG/1
40 TABLET, DELAYED RELEASE ORAL DAILY
Qty: 30 TABLET | Refills: 3 | Status: SHIPPED | OUTPATIENT
Start: 2022-01-01

## 2022-01-01 RX ADMIN — CYANOCOBALAMIN 1000 MCG: 1000 INJECTION, SOLUTION INTRAMUSCULAR at 11:09

## 2022-01-01 RX ADMIN — SODIUM CHLORIDE 1000 MG: 9 INJECTION, SOLUTION INTRAVENOUS at 10:12

## 2022-01-01 RX ADMIN — APREPITANT 130 MG: 130 INJECTION, EMULSION INTRAVENOUS at 11:10

## 2022-01-01 RX ADMIN — SODIUM CHLORIDE 1000 MG: 9 INJECTION, SOLUTION INTRAVENOUS at 12:09

## 2022-01-01 RX ADMIN — SODIUM CHLORIDE 1000 MG: 9 INJECTION, SOLUTION INTRAVENOUS at 11:10

## 2022-01-01 RX ADMIN — CARBOPLATIN 360 MG: 10 INJECTION, SOLUTION INTRAVENOUS at 10:11

## 2022-01-01 RX ADMIN — SODIUM CHLORIDE 2448 ML: 9 INJECTION, SOLUTION INTRAVENOUS at 10:11

## 2022-01-01 RX ADMIN — SODIUM CHLORIDE 1000 ML: 9 INJECTION, SOLUTION INTRAVENOUS at 09:12

## 2022-01-01 RX ADMIN — SODIUM CHLORIDE 500 ML: 9 INJECTION, SOLUTION INTRAVENOUS at 03:09

## 2022-01-01 RX ADMIN — SODIUM CHLORIDE: 9 INJECTION, SOLUTION INTRAVENOUS at 11:10

## 2022-01-01 RX ADMIN — SODIUM ZIRCONIUM CYCLOSILICATE 10 G: 5 POWDER, FOR SUSPENSION ORAL at 03:09

## 2022-01-01 RX ADMIN — DEXAMETHASONE SODIUM PHOSPHATE 0.25 MG: 10 INJECTION, SOLUTION INTRAMUSCULAR; INTRAVENOUS at 09:11

## 2022-01-01 RX ADMIN — MIDAZOLAM 1 MG: 1 INJECTION INTRAMUSCULAR; INTRAVENOUS at 08:08

## 2022-01-01 RX ADMIN — CYANOCOBALAMIN 1000 MCG: 1000 INJECTION, SOLUTION INTRAMUSCULAR; SUBCUTANEOUS at 10:12

## 2022-01-01 RX ADMIN — PALONOSETRON 0.25 MG: 0.05 INJECTION, SOLUTION INTRAVENOUS at 11:09

## 2022-01-01 RX ADMIN — DEXAMETHASONE SODIUM PHOSPHATE 0.25 MG: 10 INJECTION, SOLUTION INTRAMUSCULAR; INTRAVENOUS at 10:12

## 2022-01-01 RX ADMIN — HUMAN INSULIN 5 UNITS: 100 INJECTION, SOLUTION SUBCUTANEOUS at 03:09

## 2022-01-01 RX ADMIN — MAGNESIUM SULFATE HEPTAHYDRATE 2 G: 500 INJECTION, SOLUTION INTRAMUSCULAR; INTRAVENOUS at 10:11

## 2022-01-01 RX ADMIN — CALCIUM CHLORIDE 0.5 G: 100 INJECTION INTRAVENOUS; INTRAVENTRICULAR at 03:09

## 2022-01-01 RX ADMIN — CARBOPLATIN 410 MG: 10 INJECTION, SOLUTION INTRAVENOUS at 12:09

## 2022-01-01 RX ADMIN — APREPITANT 130 MG: 130 INJECTION, EMULSION INTRAVENOUS at 10:12

## 2022-01-01 RX ADMIN — CARBOPLATIN 400 MG: 10 INJECTION, SOLUTION INTRAVENOUS at 11:12

## 2022-01-01 RX ADMIN — MAGNESIUM SULFATE HEPTAHYDRATE: 500 INJECTION, SOLUTION INTRAMUSCULAR; INTRAVENOUS at 10:11

## 2022-01-01 RX ADMIN — LIDOCAINE HYDROCHLORIDE 5 ML: 20 INJECTION, SOLUTION INFILTRATION; PERINEURAL at 08:08

## 2022-01-01 RX ADMIN — APREPITANT 130 MG: 130 INJECTION, EMULSION INTRAVENOUS at 10:11

## 2022-01-01 RX ADMIN — CARBOPLATIN 505 MG: 10 INJECTION, SOLUTION INTRAVENOUS at 12:10

## 2022-01-01 RX ADMIN — CYANOCOBALAMIN 1000 MCG: 1000 INJECTION, SOLUTION INTRAMUSCULAR; SUBCUTANEOUS at 10:11

## 2022-01-01 RX ADMIN — APREPITANT 130 MG: 130 INJECTION, EMULSION INTRAVENOUS at 11:09

## 2022-01-01 RX ADMIN — SODIUM BICARBONATE 50 MEQ: 84 INJECTION, SOLUTION INTRAVENOUS at 03:09

## 2022-01-01 RX ADMIN — FENTANYL CITRATE 25 MCG: 50 INJECTION, SOLUTION INTRAMUSCULAR; INTRAVENOUS at 08:08

## 2022-01-01 RX ADMIN — PALONOSETRON 0.25 MG: 0.05 INJECTION, SOLUTION INTRAVENOUS at 11:10

## 2022-01-01 RX ADMIN — SODIUM CHLORIDE 1000 MG: 9 INJECTION, SOLUTION INTRAVENOUS at 10:11

## 2022-01-05 ENCOUNTER — HISTORICAL (OUTPATIENT)
Dept: RADIOLOGY | Facility: HOSPITAL | Age: 62
End: 2022-01-05

## 2022-02-08 ENCOUNTER — HISTORICAL (OUTPATIENT)
Dept: WOUND CARE | Facility: HOSPITAL | Age: 62
End: 2022-02-08

## 2022-02-14 ENCOUNTER — HISTORICAL (OUTPATIENT)
Dept: ADMINISTRATIVE | Facility: HOSPITAL | Age: 62
End: 2022-02-14

## 2022-02-14 ENCOUNTER — HISTORICAL (OUTPATIENT)
Dept: RADIOLOGY | Facility: HOSPITAL | Age: 62
End: 2022-02-14

## 2022-03-08 ENCOUNTER — HISTORICAL (OUTPATIENT)
Dept: ADMINISTRATIVE | Facility: HOSPITAL | Age: 62
End: 2022-03-08

## 2022-03-08 LAB
ABS NEUT (OLG): 7.82 (ref 2.1–9.2)
BASOPHILS # BLD AUTO: 0.1 10*3/UL (ref 0–0.2)
BASOPHILS NFR BLD AUTO: 1 %
BUN SERPL-MCNC: 9.5 MG/DL (ref 8.4–25.7)
CALCIUM SERPL-MCNC: 9.3 MG/DL (ref 8.7–10.5)
CHLORIDE SERPL-SCNC: 101 MMOL/L (ref 98–107)
CO2 SERPL-SCNC: 29 MMOL/L (ref 23–31)
CREAT SERPL-MCNC: 1.14 MG/DL (ref 0.73–1.18)
CREAT/UREA NIT SERPL: 8
EOSINOPHIL # BLD AUTO: 0.2 10*3/UL (ref 0–0.9)
EOSINOPHIL NFR BLD AUTO: 2 %
ERYTHROCYTE [DISTWIDTH] IN BLOOD BY AUTOMATED COUNT: 14 % (ref 11.5–17)
GLUCOSE SERPL-MCNC: 171 MG/DL (ref 82–115)
HCT VFR BLD AUTO: 50.5 % (ref 42–52)
HEMOLYSIS INTERF INDEX SERPL-ACNC: 24
HGB BLD-MCNC: 16.1 G/DL (ref 14–18)
ICTERIC INTERF INDEX SERPL-ACNC: 1
LIPEMIC INTERF INDEX SERPL-ACNC: <0
LYMPHOCYTES # BLD AUTO: 3.2 10*3/UL (ref 0.6–4.6)
LYMPHOCYTES NFR BLD AUTO: 26 %
MANUAL DIFF? (OHS): NO
MCH RBC QN AUTO: 27.8 PG (ref 27–31)
MCHC RBC AUTO-ENTMCNC: 31.9 G/DL (ref 33–36)
MCV RBC AUTO: 87.2 FL (ref 80–94)
MONOCYTES # BLD AUTO: 0.9 10*3/UL (ref 0.1–1.3)
MONOCYTES NFR BLD AUTO: 8 %
NEUTROPHILS # BLD AUTO: 7.82 10*3/UL (ref 2.1–9.2)
NEUTROPHILS NFR BLD AUTO: 64 %
PLATELET # BLD AUTO: 300 10*3/UL (ref 130–400)
PMV BLD AUTO: 11.2 FL (ref 9.4–12.4)
POTASSIUM SERPL-SCNC: 4.6 MMOL/L (ref 3.5–5.1)
RBC # BLD AUTO: 5.79 10*6/UL (ref 4.7–6.1)
SODIUM SERPL-SCNC: 141 MMOL/L (ref 136–145)
WBC # SPEC AUTO: 12.3 10*3/UL (ref 4.5–11.5)

## 2022-03-10 ENCOUNTER — HISTORICAL (OUTPATIENT)
Dept: ADMINISTRATIVE | Facility: HOSPITAL | Age: 62
End: 2022-03-10

## 2022-04-11 ENCOUNTER — HISTORICAL (OUTPATIENT)
Dept: ADMINISTRATIVE | Facility: HOSPITAL | Age: 62
End: 2022-04-11
Payer: MEDICARE

## 2022-04-27 VITALS
WEIGHT: 200 LBS | BODY MASS INDEX: 28.63 KG/M2 | OXYGEN SATURATION: 92 % | DIASTOLIC BLOOD PRESSURE: 76 MMHG | HEIGHT: 70 IN | SYSTOLIC BLOOD PRESSURE: 110 MMHG

## 2022-05-11 ENCOUNTER — OFFICE VISIT (OUTPATIENT)
Dept: WOUND CARE | Facility: HOSPITAL | Age: 62
End: 2022-05-11
Attending: NURSE PRACTITIONER
Payer: MEDICARE

## 2022-05-11 VITALS
HEART RATE: 58 BPM | BODY MASS INDEX: 26.48 KG/M2 | TEMPERATURE: 98 F | DIASTOLIC BLOOD PRESSURE: 71 MMHG | WEIGHT: 185 LBS | HEIGHT: 70 IN | SYSTOLIC BLOOD PRESSURE: 152 MMHG

## 2022-05-11 DIAGNOSIS — L97.509 TYPE 2 DIABETES MELLITUS WITH DIABETIC TOE ULCER: Primary | ICD-10-CM

## 2022-05-11 DIAGNOSIS — E11.621 TYPE 2 DIABETES MELLITUS WITH DIABETIC TOE ULCER: Primary | ICD-10-CM

## 2022-05-11 DIAGNOSIS — L97.512 NON-PRESSURE CHRONIC ULCER OF OTHER PART OF RIGHT FOOT WITH FAT LAYER EXPOSED: ICD-10-CM

## 2022-05-11 PROBLEM — R07.89 PAIN OF STERNUM: Status: ACTIVE | Noted: 2022-05-11

## 2022-05-11 PROBLEM — R09.02 HYPOXIA: Status: ACTIVE | Noted: 2022-05-11

## 2022-05-11 PROBLEM — I10 HYPERTENSION: Status: ACTIVE | Noted: 2022-05-11

## 2022-05-11 PROBLEM — J96.20 ACUTE ON CHRONIC RESPIRATORY FAILURE: Status: ACTIVE | Noted: 2020-11-25

## 2022-05-11 PROBLEM — I25.10 ARTERIOSCLEROSIS OF CORONARY ARTERY: Status: ACTIVE | Noted: 2022-05-11

## 2022-05-11 PROBLEM — K21.9 GASTROESOPHAGEAL REFLUX DISEASE: Status: ACTIVE | Noted: 2022-05-11

## 2022-05-11 PROBLEM — M19.90 ARTHRITIS: Status: ACTIVE | Noted: 2022-05-11

## 2022-05-11 PROBLEM — Z72.0 TOBACCO USER: Status: ACTIVE | Noted: 2022-05-11

## 2022-05-11 PROBLEM — E78.5 HYPERLIPIDEMIA: Status: ACTIVE | Noted: 2022-05-11

## 2022-05-11 PROBLEM — R91.8 LUNG MASS: Status: ACTIVE | Noted: 2022-05-11

## 2022-05-11 PROBLEM — E11.9 DIABETES MELLITUS: Status: ACTIVE | Noted: 2022-05-11

## 2022-05-11 PROCEDURE — 99213 OFFICE O/P EST LOW 20 MIN: CPT | Mod: 25

## 2022-05-11 PROCEDURE — 97597 DBRDMT OPN WND 1ST 20 CM/<: CPT

## 2022-05-11 RX ORDER — FLUTICASONE FUROATE AND VILANTEROL TRIFENATATE 200; 25 UG/1; UG/1
POWDER RESPIRATORY (INHALATION)
COMMUNITY
Start: 2022-03-24

## 2022-05-11 RX ORDER — DONEPEZIL HYDROCHLORIDE 10 MG/1
10 TABLET, FILM COATED ORAL DAILY
COMMUNITY
Start: 2022-04-09

## 2022-05-11 RX ORDER — SIMVASTATIN 80 MG/1
80 TABLET, FILM COATED ORAL NIGHTLY
COMMUNITY
Start: 2022-05-05

## 2022-05-11 RX ORDER — ESCITALOPRAM OXALATE 20 MG/1
20 TABLET ORAL DAILY
COMMUNITY
Start: 2022-04-22

## 2022-05-11 RX ORDER — CARVEDILOL 3.12 MG/1
3.12 TABLET ORAL 2 TIMES DAILY
COMMUNITY
Start: 2022-05-05

## 2022-05-11 RX ORDER — SITAGLIPTIN 100 MG/1
100 TABLET, FILM COATED ORAL DAILY
COMMUNITY
Start: 2022-03-24

## 2022-05-11 RX ORDER — AMLODIPINE BESYLATE 5 MG/1
5 TABLET ORAL DAILY
COMMUNITY
Start: 2022-04-22

## 2022-05-11 RX ORDER — GABAPENTIN 600 MG/1
1200 TABLET ORAL 3 TIMES DAILY
COMMUNITY
Start: 2022-05-05

## 2022-05-11 RX ORDER — LISINOPRIL 20 MG/1
20 TABLET ORAL DAILY
COMMUNITY
Start: 2022-04-09

## 2022-05-11 RX ORDER — PANTOPRAZOLE SODIUM 40 MG/1
40 TABLET, DELAYED RELEASE ORAL DAILY PRN
COMMUNITY
Start: 2022-03-24 | End: 2022-01-01 | Stop reason: SDUPTHER

## 2022-05-11 RX ORDER — CLOPIDOGREL BISULFATE 75 MG/1
75 TABLET ORAL DAILY
COMMUNITY
Start: 2022-04-09

## 2022-05-11 RX ORDER — METOCLOPRAMIDE 10 MG/1
10 TABLET ORAL 2 TIMES DAILY
COMMUNITY
Start: 2022-04-09

## 2022-05-11 RX ORDER — MECLIZINE HYDROCHLORIDE 25 MG/1
TABLET ORAL
COMMUNITY
Start: 2022-04-09

## 2022-05-11 RX ORDER — BUDESONIDE AND FORMOTEROL FUMARATE DIHYDRATE 160; 4.5 UG/1; UG/1
AEROSOL RESPIRATORY (INHALATION)
COMMUNITY
Start: 2022-03-10

## 2022-05-11 RX ORDER — OXYCODONE AND ACETAMINOPHEN 10; 325 MG/1; MG/1
TABLET ORAL
COMMUNITY
Start: 2022-05-02 | End: 2022-01-01

## 2022-05-11 RX ORDER — INSULIN LISPRO 100 [IU]/ML
30 INJECTION, SUSPENSION SUBCUTANEOUS 2 TIMES DAILY PRN
COMMUNITY
Start: 2022-05-03

## 2022-05-11 NOTE — PROGRESS NOTES
[facility] WOUND CARE  WOUND CARE PROGRESS NOTE      Patient name: Tate Gautam MRN: [id number]   : 1960 PCP: Stef Lerma MD     SUBJECTIVE    Chief Complaint:   Chief Complaint   Patient presents with    Non-healing Wound     Right 2nd toe amputation site        HPI:  Mr. Villavicencio is being seen for an open wound to the right 2nd toe amputation site.  His wound has continued to progress very well, and at this point it has a depth of 0.6 cm.  He does report that he does, at times, have a difficult time ambulating and seems to lose balance without the benefit of the 2nd toe for stabilization.  Otherwise he expresses no concerns regarding the wound.  He does not have Home Health and has successfully managed wound care on his own at home.      Review of Systems:  Constitutional: Negative.    Cardiovascular: Negative.    Skin:        As HPI.   All other systems reviewed and are negative.        Physical Exam  Vitals and nursing note reviewed.   Constitutional:       Appearance: Normal appearance.   Cardiovascular:      Rate and Rhythm: Normal rate.   Pulmonary:      Effort: Pulmonary effort is normal.   Skin:     General: Skin is warm and dry.      Comments: Right toe amputation site, progressing well    Neurological:      Mental Status: male is alert.     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  Wound location: Right 2nd toe amputation site  Equipment utilized: Dermal Curette  Wound Condition: Improved  Procedure: Selective Debridement  Anesthesia: N/A  Estimated blood loss: N/A  Complications: none  Patient tolerated procedure: Well  Wound dressing applied: Puracol collagen, 4 x4 gauze, kerlix  Description of the procedure:  Informed consent was obtained pre-procedurally, with the risks, benefits and alteratives to the planned procedure explained at length to the patient, who expressed understanding and wished to proceed. A time out was observed and on the day of procedure the patient was correctly identified.    The wound was prepared initially by irrigating with wound cleanser.     A selective debridement of the wound was then carried out removing the following tissue types:  Dried fibrinous tissue, devitaized tissue, exudate, biofilm      Total surface area debrided was <20 cm^2.     Post Debridement Measurements Wound #  No change cm.     Percent debrided 100 %.     Hemostasis was achieved by:  N/A         IMPRESSION     1. 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 Non-pressure chronic ulcer of other part of right foot with fat layer exposed      PLAN  Follow up in about 2 weeks (around 5/25/2022).     #2 - Wound dressing:  Right 2nd toe amputation site: *leave current dressing on til Saturday* On Saturday, cleanse with wound cleanser, apply silver alginate and change PRN soilage/drainage   Patient does not have home health      MEDICATIONS    Current Outpatient Medications:     amLODIPine (NORVASC) 5 MG tablet, Take 5 mg by mouth once daily., Disp: , Rfl:     BREO ELLIPTA 200-25 mcg/dose DsDv diskus inhaler, , Disp: , Rfl:     carvediloL (COREG) 3.125 MG tablet, Take 3.125 mg  by mouth 2 (two) times daily., Disp: , Rfl:     clopidogreL (PLAVIX) 75 mg tablet, Take 75 mg by mouth once daily., Disp: , Rfl:     donepeziL (ARICEPT) 10 MG tablet, TAKE ONE TABLET BY MOUTH ONCE DAILY FOR memory, Disp: , Rfl:     EScitalopram oxalate (LEXAPRO) 20 MG tablet, Take 20 mg by mouth once daily., Disp: , Rfl:     gabapentin (NEURONTIN) 600 MG tablet, Take 1,200 mg by mouth 3 (three) times daily., Disp: , Rfl:     HUMALOG MIX 75-25 KWIKPEN 100 unit/mL (75-25) InPn, , Disp: , Rfl:     JANUVIA 100 mg Tab, , Disp: , Rfl:     lisinopriL (PRINIVIL,ZESTRIL) 20 MG tablet, Take 20 mg by mouth once daily., Disp: , Rfl:     meclizine (ANTIVERT) 25 mg tablet, TAKE ONE TABLET BY MOUTH EVERY 8 HOURS AS NEEDED MAY CAUSE DROWSINESS, Disp: , Rfl:     metoclopramide HCl (REGLAN) 10 MG tablet, Take 10 mg by mouth 2 (two) times daily., Disp: , Rfl:     oxyCODONE-acetaminophen (PERCOCET)  mg per tablet, TAKE ONE TABLET BY MOUTH EVERY 4 HOURS AS NEEDED PAIN MAY CAUSE DROWSINESS, Disp: , Rfl:     pantoprazole (PROTONIX) 40 MG tablet, TAKE ONE TABLET BY MOUTH ONCE DAILY for stomach, Disp: , Rfl:     simvastatin (ZOCOR) 80 MG tablet, Take 80 mg by mouth once daily., Disp: , Rfl:     SYMBICORT 160-4.5 mcg/actuation HFAA, SMARTSI Puff(s) Via Inhaler Morning-Evening, Disp: , Rfl:  Review of patient's allergies indicates:  No Known Allergies    VITAL SIGNS  Vitals:    22 0924   BP: (!) 152/71   Pulse: (!) 58   Temp: 98.3 °F (36.8 °C)       Electronically signed:  Carolann Dawson, NP, MSN

## 2022-05-11 NOTE — PROGRESS NOTES
Right 2nd toe amputation site: *leave current dressing on til Saturday* On Saturday, cleanse with wound cleanser, apply silver alginate and change PRN soilage/drainage       Selective debridement - dermal curette  Dried fibrinous tissue, devitaized tissue, exudate, biofilm

## 2022-05-21 NOTE — HISTORICAL [facility]ER
This is a historical note converted from [place]. Formatting and pictures may have been removed.  Please reference [employer] for original formatting and attached multimedia. Chief Complaint  EXTENSIVE SKIN AND SOFT TISSUE ACUTE NECROINFLAMMATORY CHANGES CONSISTENT WITH ISCHEMIC AND/OR INFECTIOUS GANGRENE.  ?   ACUTE OSTEOMYELITIS.  Reason for Consultation  Wound care - Right second toe amputation site  History of Present Illness  ?Hx obtained from EMR, Dr. Lerma:  81-year-old  male admitted from [facility] for an amputation of his toe. ?He developed gangrene as an outpatient and was treated outpatient with wound care but failed outpatient therapy. ?He is was admitted to the hospital and had an amputation of his toe and partial foot. ?He is admitted to us for prolonged wound care pain control as well as advanced medical management for his diabetes and hypertension. ?The patient is having some pain because he just underwent a dressing change. ?There was some bleeding involved during the dressing changes is since been resolved. ?Is any shortness of breath nausea vomiting his blood sugar was high this morning we will follow this closely after admission.?  ?   Wound care consultation:  Mr. Gautam was a recent patient at [facility] and [facility], first seen on 2/8/22.? He initially presented with a small wound on the distal end of the right second toe.? He was scheduled to have a vascular consult with probable procedure based on the U/S results that wound care had performed on 2/14/22:  30-49 % STENOSIS AT THE RIGHT COMMON FEMORAL ARTERY SECONDARY  LOCALIZED PLAQUE FORMATION  When the patient presented to wound care following that procedure, it was noted that the toe was necrotic, with an increase in pain, erythema, odor, exudate. He was referred to Dr. Juan Miranda for immediate consultation and was scheduled for amputation due to gangrene and osteomyelitis.? The amputation was  performed on 3/24/22.  Today the wound is much improved, with an open amputation site at the right 2nd metatarsal.? There is 90% granulation tissue, 10% necrotic slough in the wound bed, very little bleeding.? We will continue to pack with Dakins moistened gauze, and consider wound vac placement upon discharge and return to wound clinic.  L 5.1 x W 1.9 x D 3.2 cm.  ?   Wound care will continue to follow during the patients stay in LTAC.? The patient is to return to wound care upon discharge.   Problem List/Past Medical History  Ongoing  Arthritis  Atherosclerosis of artery of both lower extremities  CAD - Coronary artery disease  Diabetes mellitus  Gangrene of right foot  HTN (hypertension)  Hyperlipidemia  Hypoxia  Lung mass  Neuropathy  Reflux  Sternal pain  Tobacco user  Tobacco user  Historical  Heart attack  Stroke  Procedure/Surgical History  Debridement, muscle and/or fascia (includes epidermis, dermis, and subcutaneous tissue, if performed); first 20 sq cm or less (03/24/2022)  Excision of Right Foot Tendon, Open Approach (03/24/2022)  Amputation Toe (Right) (03/10/2022)  Detachment at Right 2nd Toe, Complete, Open Approach (03/10/2022)  Excision of Toe Nail, External Approach (03/10/2022)  Excision of Toe Nail, External Approach (03/10/2022)  Excision of Toe Nail, External Approach (03/10/2022)  Excision of Toe Nail, External Approach (03/10/2022)  Debridement (eg, high pressure waterjet with/without suction, sharp selective debridement with scissors, scalpel and forceps), open wound, (eg, fibrin, devitalized epidermis and/or dermis, exudate, debris, biofilm), including topical application(s), wound (02/22/2022)  Extraction of Right Foot Skin, External Approach (02/22/2022)  Debridement (eg, high pressure waterjet with/without suction, sharp selective debridement with scissors, scalpel and forceps), open wound, (eg, fibrin, devitalized epidermis and/or dermis, exudate, debris, biofilm), including topical  application(s), wound (02/15/2022)  Extraction of Right Foot Skin, External Approach (02/15/2022)  Insertion of Endotracheal Airway into Trachea, Via Natural or Artificial Opening (11/24/2020)  Intubation, endotracheal, emergency procedure (11/24/2020)  Repair Neck Skin, External Approach (07/02/2017)  Simple repair of superficial wounds of scalp, neck, axillae, external genitalia, trunk and/or extremities (including hands and feet); 2.6 cm to 7.5 cm (07/02/2017)  Reposition Sternum with Rigid Plate Internal Fixation Device, Open Approach (05/18/2016)  Sternal Plating (None) (05/18/2016)  (Aorto)coronary bypass of one coronary artery (10/17/2014)  Bypass CABG (.) (10/17/2014)  Extracorporeal circulation auxiliary to open heart surgery (10/17/2014)  Single internal mammary-coronary artery bypass (10/17/2014)  Coronary arteriography using two catheters (10/14/2014)  Coronary Artery Angiography (10/14/2014)  Left heart cardiac catheterization (10/14/2014)  brain surgery at age 11  Colonoscopy  neck surgery x3  right hand surgery  right leg stent- protege, gps  right thumb surgery   Medications  Inpatient  budesonide 0.5 mg/2 mL inhalation suspension, 0.5 mg= 2 mL, NEB, Once-NOW  DuoNeb 0.5 mg-2.5 mg/3 mL inhalation solution, 3 mL, NEB, Once-NOW  Propofol 20 mL vial, 50 mg= 5 mL, IV Push, Once  Home  albuterol CFC free 90 mcg/inh inhalation aerosol with adapter, 2 puff(s), Oral, QID, PRN  aspirin 81 mg oral tablet, CHEWABLE, 81 mg= 1 tab(s), Oral, Daily,? ?Not taking  Breo Ellipta 200 mcg-25 mcg/inh inhalation powder, 1 puff(s), INH, Daily  Cipro 500 mg oral tablet, 500 mg= 1 tab(s), Oral, q12hr,? ?Not taking  citalopram 20 mg oral tablet, 20 mg= 1 tab(s), Oral, Daily  Coreg 3.125 mg oral tablet, 3.125 mg= 1 tab(s), Oral, BIDWMeal, 3 refills  donepezil 10 mg oral tablet, 10 mg= 1 tab(s), Oral, Daily  doxycycline 100 mg oral tab, BID,? ?Not taking  escitalopram 20 mg oral tablet, 20 mg= 1 tab(s), Oral, Daily,? ?Not  taking  gabapentin 600 mg oral tablet, 600 mg= 1 tab(s), Oral, TID  GABAPENTIN 600 MG TABS, 600 mg= 1 tab(s), Oral, Daily  Januvia 50 mg oral tablet, 100 mg= 2 tab(s), Oral, Daily  lactulose 10 g/15 mL oral syrup, 10 gm= 15 mL, Oral, Daily  Lantus 100 U/mL (per 1 unit), 15 units, Subcutaneous, Daily  lisinopril 20 mg oral tablet, 20 mg= 1 tab(s), Oral, Daily  meclizine 25 mg oral tablet, 25 mg= 1 tab(s), Oral, q8hr,? ?Not taking  morphine 10 mg/mL preservative-free intravenous solution, 1 mg, IV Push, q4hr, PRN  Norvasc 5 mg oral tablet, 5 mg= 1 tab(s), Oral, Daily  OXYCOD/APAP TAB 10-325MG, 1 tab(s), Oral, q4-6hr, PRN  Pantoprazole 40 mg ORAL EC-Tablet, 40 mg= 1 tab(s), Oral, Daily  Plavix 75 mg oral tablet, 75 mg= 1 tab(s), Oral, Daily,? ?Not taking  Reglan 10 mg oral tablet, 10 mg= 1 tab(s), Oral, BID  Reglan 10 mg oral tablet, 10 mg= 1 tab(s), Oral, BID  simvastatin 80 mg oral tablet, 80 mg= 1 tab(s), Oral, With Supper  temazepam 15 mg oral capsule, 15 mg= 1 cap(s), Oral, Once a day (at bedtime), PRN  Allergies  No Known Allergies  Social History  Abuse/Neglect  No, 03/11/2022  No, 03/10/2022  No, 10/12/2021  No, 11/24/2020  No, 03/20/2020  Alcohol - Medium Risk, 10/13/2014  Current, Beer, 1-2 times per month, 01/03/2017  Current, Beer, Liquor, 1-2 times per week, 06/29/2016  Employment/School  Disabled, 06/29/2016  Home/Environment  Lives with Significant other. Living situation: Home with assistance., 06/29/2016  Nutrition/Health  Regular, 06/29/2016  Substance Use - High Risk, 10/13/2014  Current, Marijuana, 03/11/2022  Past, 03/10/2022  Current, Marijuana, 1-2 times per week, 06/29/2016  Tobacco - High Risk, 10/13/2014  10 or more cigarettes (1/2 pack or more)/day in last 30 days, No, 03/11/2022  4 or less cigarettes(less than 1/4 pack)/day in last 30 days, No, 03/10/2022  10 or more cigarettes (1/2 pack or more)/day in last 30 days, Cigarettes, N/A, 10/12/2021  10 or more cigarettes (1/2 pack or more)/day  in last 30 days, No, 11/24/2020  10 or more cigarettes (1/2 pack or more)/day in last 30 days, Cigarettes, No, 40 year(s)., 03/20/2020  Current every day smoker, Cigarettes, 20 per day. Ready to change: Yes., 01/03/2017  Family History  CAD - Coronary artery disease: Mother and Father.  Cancer: Mother.  Lung cancer: Mother and Sister.  Primary malignant neoplasm of bone: Mother and Sister.  Immunizations  Vaccine Date Status Comments   COVID-19 mRNA, LNP-S, PF - Moderna 12/03/2021 Recorded    influenza virus vaccine, inactivated 10/05/2021 Recorded    COVID-19 mRNA, LNP-S, PF - Moderna 04/01/2021 Recorded    COVID-19 mRNA, LNP-S, PF - Moderna 03/04/2021 Recorded    tetanus/diphtheria/pertussis, acel(Tdap) 07/02/2017 Given other (see comment)

## 2022-05-25 ENCOUNTER — HOSPITAL ENCOUNTER (OUTPATIENT)
Dept: WOUND CARE | Facility: HOSPITAL | Age: 62
Discharge: HOME | End: 2022-05-25
Attending: NURSE PRACTITIONER
Payer: MEDICARE

## 2022-05-25 VITALS
TEMPERATURE: 97 F | SYSTOLIC BLOOD PRESSURE: 161 MMHG | DIASTOLIC BLOOD PRESSURE: 74 MMHG | HEIGHT: 70 IN | RESPIRATION RATE: 18 BRPM | WEIGHT: 185 LBS | BODY MASS INDEX: 26.48 KG/M2 | HEART RATE: 62 BPM

## 2022-05-25 DIAGNOSIS — R91.8 LUNG MASS: ICD-10-CM

## 2022-05-25 DIAGNOSIS — Z72.0 TOBACCO USER: ICD-10-CM

## 2022-05-25 DIAGNOSIS — L97.512 NON-PRESSURE CHRONIC ULCER OF OTHER PART OF RIGHT FOOT WITH FAT LAYER EXPOSED: Primary | ICD-10-CM

## 2022-05-25 DIAGNOSIS — E11.621 TYPE 2 DIABETES MELLITUS WITH DIABETIC TOE ULCER: ICD-10-CM

## 2022-05-25 DIAGNOSIS — L97.509 TYPE 2 DIABETES MELLITUS WITH DIABETIC TOE ULCER: ICD-10-CM

## 2022-05-25 PROCEDURE — 97597 DBRDMT OPN WND 1ST 20 CM/<: CPT

## 2022-05-25 PROCEDURE — 17250 CHEM CAUT OF GRANLTJ TISSUE: CPT | Mod: 59

## 2022-05-25 PROCEDURE — 99213 OFFICE O/P EST LOW 20 MIN: CPT

## 2022-05-25 RX ORDER — SILVER NITRATE 38.21; 12.74 MG/1; MG/1
2 STICK TOPICAL ONCE
Status: DISPENSED | OUTPATIENT
Start: 2022-05-25

## 2022-05-25 NOTE — PROGRESS NOTES
Dr. Lerma PCP today         Subjective:       Patient ID: Tate Gautam is a 62 y.o. male.    Chief Complaint: Diabetic Foot Ulcer (Right foot ) and Non-healing Wound Follow Up (Right 2nd toe amp site)    HPI   Mr. Gautam is being seen for a DFU to the right 2nd toe amputation site.  He reports that he has a F/Up appt today with Dr. Lerma, his PCP.  His B/P was slightly elevated at 161/74, asymptomatic.  The DFU on the right 2nd toe amp site was selectively debrided and post debridement was found to be healed.  Silver nitrate was used to control homeostasis as the area was scratched and had a scant amount of bleeding during the debridement process.  The patient was instructed to contact the office should he have any further needs, but is considered to be healed at this time.    Referral is being sent to the smoking cessation program.    Review of Systems   Constitutional: Negative.    HENT: Negative.    Respiratory: Negative.    Cardiovascular: Negative.    Endocrine: Negative.    Skin: Negative.    Psychiatric/Behavioral: Negative.          Objective:        Physical Exam  Vitals (161/74) reviewed.   Constitutional:       Appearance: Normal appearance. He is normal weight.   Cardiovascular:      Pulses: Normal pulses.   Pulmonary:      Effort: Pulmonary effort is normal.   Musculoskeletal:         General: Normal range of motion.      Comments: Amputation right 2nd toe   Skin:     General: Skin is warm and dry.   Neurological:      General: No focal deficit present.      Mental Status: He is alert and oriented to person, place, and time.   Psychiatric:         Mood and Affect: Mood normal.         Behavior: Behavior normal.         Thought Content: Thought content normal.         Judgment: Judgment normal.       [REMOVED]      Wound 05/11/22 0927 Other (comment) Right anterior Toe, second #2 (Removed)   05/11/22 0927    Pre-existing: Yes   Primary Wound Type: Other   Side: Right   Orientation: anterior  "  Location: Toe, second   Wound Number: #2   Ankle-Brachial Index:    Pulses:    Removal Indication and Assessment:    Wound Outcome: Healed   (Retired) Wound Type:    (Retired) Wound Length (cm):    (Retired) Wound Width (cm):    (Retired) Depth (cm):    Wound Description (Comments):    Removal Indications:    Removed 05/25/22 1050   Drainage Amount None 05/25/22 0959   Appearance Fibrin 05/25/22 0959   Tissue loss description Full thickness 05/25/22 0959   Periwound Area Intact 05/25/22 0959   Wound Edges Defined 05/25/22 0959   Wound Length (cm) 1.5 cm 05/25/22 0959   Wound Width (cm) 0.4 cm 05/25/22 0959   Wound Depth (cm) 0.2 cm 05/25/22 0959   Wound Volume (cm^3) 0.12 cm^3 05/25/22 0959   Wound Surface Area (cm^2) 0.6 cm^2 05/25/22 0959   Care Wound cleanser 05/25/22 1049   Dressing Applied 05/25/22 1049         Assessment:         ICD-10-CM ICD-9-CM   1. Non-pressure chronic ulcer of other part of right foot with fat layer exposed  L97.512 707.15   2. 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   Consent Done?:  Yes (Verbal)  Local anesthesia used?: No       Wound Details:    Location:  Right foot    Location:  Right 2nd Toe (amputation site)    Type of Debridement:  Non-excisional       Length (cm):  1.5       Area (sq cm):  0.6       Width (cm):  0.4       Percent Debrided (%):  100       Depth (cm):  0.2       Total Area Debrided (sq cm):  0.6    Depth of debridement:  Epidermis/Dermis    Devitalized tissue debrided:  Callus, Fibrin and Necrotic/Eschar    Instruments:  Curette (Dermal)      Silver Nitrate applied to achieve homeostasis  Wound is " healed  LR     Excisional debridement performed:  [] Yes [x] No   Selective debridement performed:  [x] Yes [] No   Mechanical debridement performed:  [] Yes [x] No   Silver nitrate applied:    [x] Yes [] No   To achieve homeostasis  Labs ordered this visit:   [] Yes [x] No   Imaging ordered this visit:   [] Yes [x] No   Tissue pathology and/or culture taken:  [] Yes [x] No     HOME HEALTH AGENCY:  N/A  TIMES PER WEEK/DAYS:  N/A  ORDERS:    Patient Orders:  Apply VicksSalve to area of closed wound nightly.  Leave area open to air.  Wound is healed - no follow up needed unless condition changes    Follow up for discharge return as needed.

## 2022-07-18 ENCOUNTER — HOSPITAL ENCOUNTER (OUTPATIENT)
Dept: RADIOLOGY | Facility: HOSPITAL | Age: 62
Discharge: HOME | End: 2022-07-18
Attending: INTERNAL MEDICINE
Payer: MEDICARE

## 2022-07-18 DIAGNOSIS — R91.1 LUNG NODULE: ICD-10-CM

## 2022-07-18 PROCEDURE — 71250 CT THORAX DX C-: CPT | Mod: TC

## 2022-08-30 NOTE — DISCHARGE SUMMARY
Radiology Post-Procedure Note    Pre Op Diagnosis: Right lung mass    Post Op Diagnosis: Right lung mass    Procedure: right lung biopsy    Procedure performed by: Nguyễn Shaikh M.D.    Written Informed Consent Obtained: Yes    Specimen Removed: YES    Estimated Blood Loss: Minimal    Findings:   Standard Right Lung Biopsy    Patient tolerated procedure well.    Nguyễn Shaikh MD

## 2022-08-30 NOTE — H&P
Radiology History & Physical      SUBJECTIVE:     Chief Complaint: Right Lung Mass    History of Present Illness:  Tate Gautam is a 62 y.o. male who presents for Biopsy  Past Medical History:   Diagnosis Date    AAA (abdominal aortic aneurysm)     Anxiety     Chronic obstructive lung disease     Depression     Diabetes mellitus, type 2     GERD (gastroesophageal reflux disease)     Hyperlipidemia     Hypertension     Lung mass     On home O2     2-4 L    Spinal stenosis      Past Surgical History:   Procedure Laterality Date    ANGIOPLASTY      BRAIN SURGERY      LUNG BIOPSY Right 08/30/2022    Dr. Shaikh    NECK SURGERY      x3    right 2nd toe amputation         Home Meds:   Prior to Admission medications    Medication Sig Start Date End Date Taking? Authorizing Provider   amLODIPine (NORVASC) 5 MG tablet Take 5 mg by mouth once daily. 4/22/22  Yes Historical Provider   carvediloL (COREG) 3.125 MG tablet Take 3.125 mg by mouth 2 (two) times daily. 5/5/22  Yes Historical Provider   donepeziL (ARICEPT) 10 MG tablet Take 10 mg by mouth once daily. 4/9/22  Yes Historical Provider   EScitalopram oxalate (LEXAPRO) 20 MG tablet Take 20 mg by mouth once daily. 4/22/22  Yes Historical Provider   gabapentin (NEURONTIN) 600 MG tablet Take 1,200 mg by mouth 3 (three) times daily. 5/5/22  Yes Historical Provider   JANUVIA 100 mg Tab Take 100 mg by mouth once daily. 3/24/22  Yes Historical Provider   lisinopriL (PRINIVIL,ZESTRIL) 20 MG tablet Take 20 mg by mouth once daily. 4/9/22  Yes Historical Provider   metoclopramide HCl (REGLAN) 10 MG tablet Take 10 mg by mouth 2 (two) times daily. 4/9/22  Yes Historical Provider   oxyCODONE-acetaminophen (PERCOCET)  mg per tablet TAKE ONE TABLET BY MOUTH EVERY 4 HOURS AS NEEDED PAIN MAY CAUSE DROWSINESS 5/2/22  Yes Historical Provider   simvastatin (ZOCOR) 80 MG tablet Take 80 mg by mouth nightly. 5/5/22  Yes Historical Provider   albuterol (PROVENTIL) 2.5 mg /3 mL (0.083 %)  nebulizer solution Take 2.5 mg by nebulization every 4 (four) hours as needed for Wheezing. Rescue    Historical Provider   albuterol (PROVENTIL/VENTOLIN HFA) 90 mcg/actuation inhaler Inhale 2 puffs into the lungs 4 (four) times daily. Rescue    Historical Provider   aspirin (ECOTRIN) 81 MG EC tablet Take 81 mg by mouth once daily.    Historical Provider   BREO ELLIPTA 200-25 mcg/dose DsDv diskus inhaler  3/24/22   Historical Provider   clopidogreL (PLAVIX) 75 mg tablet Take 75 mg by mouth once daily. 22   Historical Provider   HUMALOG MIX 75-25 KWIKPEN 100 unit/mL (75-25) InPn Inject 30 Units into the skin 2 (two) times daily as needed. 5/3/22   Historical Provider   meclizine (ANTIVERT) 25 mg tablet TAKE ONE TABLET BY MOUTH EVERY 8 HOURS AS NEEDED MAY CAUSE DROWSINESS 22   Historical Provider   pantoprazole (PROTONIX) 40 MG tablet Take 40 mg by mouth daily as needed. 3/24/22   Historical Provider   SYMBICORT 160-4.5 mcg/actuation HFAA SMARTSI Puff(s) Via Inhaler Morning-Evening 3/10/22   Historical Provider     Anticoagulants/Antiplatelets: no anticoagulation    Allergies:   Review of patient's allergies indicates:   Allergen Reactions    Duloxetine Other (See Comments)     unknown     Sedation History:  no adverse reactions    Review of Systems:   Hematological: no known coagulopathies  Respiratory: no shortness of breath  Cardiovascular: no chest pain  Gastrointestinal: no abdominal pain  Genito-Urinary: no dysuria  Musculoskeletal: negative  Neurological: no TIA or stroke symptoms         OBJECTIVE:     Vital Signs (Most Recent)  Temp: 97.8 °F (36.6 °C) (22 0617)  Pulse: (!) 55 (22 1030)  Resp: (!) 23 (22 0816)  BP: (!) 160/86 (22 1030)  SpO2: 97 % (22 1030)    Physical Exam:  ASA: 2    General: no acute distress  Mental Status: alert and oriented to person, place and time  HEENT: normocephalic, atraumatic  Chest: unlabored breathing  Heart: regular heart rate  Abdomen:  nondistended  Extremity: moves all extremities    Laboratory  Lab Results   Component Value Date    INR 1.00 08/30/2022       Lab Results   Component Value Date    WBC 8.8 08/30/2022    HGB 15.5 08/30/2022    HCT 50.4 08/30/2022    MCV 89.4 08/30/2022     08/30/2022      Lab Results   Component Value Date     03/11/2022    K 4.0 03/11/2022    CO2 28 03/11/2022    BUN 8.0 03/11/2022    CREATININE 0.93 03/11/2022    CALCIUM 8.3 03/11/2022    MG 1.7 (L) 03/21/2020    ALT 9 03/11/2022    AST 20 03/11/2022    ALBUMIN 3.3 03/11/2022    BILITOT 1.3 03/11/2022    BILIDIR 0.6 03/11/2022       ASSESSMENT/PLAN:     Sedation Plan: Moderate  Patient will undergo Right Lung Biopsy.    Nguyễn Shaikh MD

## 2022-08-30 NOTE — DISCHARGE INSTRUCTIONS
Go to ER if unusual symptoms occur. Monitor site for signs of infection & bleeding. Remove bandaid tomorrow. Resume Aspirin & Plavix tomorrow. Monitor for shortness of breath symptoms.

## 2022-09-02 PROBLEM — C34.91 ADENOCARCINOMA, LUNG, RIGHT: Status: ACTIVE | Noted: 2022-01-01

## 2022-09-02 NOTE — PROGRESS NOTES
Subjective:       Patient ID: Tate Gautam is a 62 y.o. male.    Chief Complaint: Lung Cancer      Diagnosis:  - NSCLC / Adenocarcinoma     Current Treatment:     Treatment History:      HPI    Tate Gautam  62 y.o.  male with past medical history significant for COPD on home oxygen, CAD s/p CABG, arthritis, IDDM and diabetic neuropathy referred for evaluation of newly diagnosed lung cancer.     He was followed in the [facility] for an abnormal CT of the chest.  He was 1st seen in October of 2021 & CT chest showed a new, 10 mm nodule & a chronic 4 mm nodule in the RML. Due to his overall poor health follow-up was suggested however patient missed follow-up appointments. He was then seen in July 2022 and had RML lesion biopsy which increased in size from 1 cm to 2.2 cm. 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     Interval History:     Pt is using oxygen at baseline for COPD. He is c/o chest pain, back pain, cough with green phlegm and shortness of breath. He has sharp chest pain an back pain for the last few months, exacerbated by cough and by pressing on it. He has a history of headaches for years and now c/o intermittent dizziness for months. Denies any focal weakness but has chronic neuropathy secondary to DM. Lost 15 lbs over the last 2 months.       Past Medical History:   Diagnosis Date    AAA (abdominal aortic aneurysm)     Anxiety     Chronic obstructive lung disease     Depression     Diabetes mellitus, type 2     GERD (gastroesophageal reflux disease)     Hyperlipidemia     Hypertension     Lung mass     On home O2     2-4 L    Spinal stenosis       Past Surgical History:   Procedure Laterality Date    ANGIOPLASTY      BRAIN SURGERY      LUNG BIOPSY Right 08/30/2022    Dr. Shaikh    NECK SURGERY      x3    right 2nd toe amputation       Social History     Socioeconomic History    Marital status: Unknown   Tobacco Use    Smoking status: Every Day     Packs/day: 0.25     Years:  50.00     Pack years: 12.50     Types: Cigarettes    Smokeless tobacco: Never    Tobacco comments:     Now smoking 1/2 PPD   Substance and Sexual Activity    Alcohol use: Not Currently    Drug use: Yes     Types: Marijuana     Comment: 1 joint a day    Sexual activity: Not Currently   Social History Narrative    ** Merged History Encounter **           Family History   Problem Relation Age of Onset    Cancer Mother     Heart disease Father       Review of patient's allergies indicates:   Allergen Reactions    Duloxetine Other (See Comments)     unknown      Review of Systems   Constitutional:  Positive for activity change and unexpected weight change.   HENT: Negative.     Eyes: Negative.    Respiratory:  Positive for cough and shortness of breath.    Cardiovascular:  Positive for chest pain.   Gastrointestinal: Negative.    Endocrine: Negative.    Genitourinary: Negative.    Musculoskeletal:  Positive for arthralgias and back pain.   Neurological:  Positive for dizziness. Negative for weakness.   Hematological: Negative.    Psychiatric/Behavioral: Negative.         Objective:        Vitals:    09/06/22 0911   BP: 138/67   Pulse: (!) 58   Resp: 16   Temp: 98.5 °F (36.9 °C)        Physical Exam  Constitutional:       Appearance: He is well-developed. He is ill-appearing.   HENT:      Head: Normocephalic and atraumatic.      Mouth/Throat:      Mouth: Mucous membranes are moist.   Eyes:      Conjunctiva/sclera: Conjunctivae normal.   Cardiovascular:      Rate and Rhythm: Normal rate and regular rhythm.      Heart sounds: Normal heart sounds.   Pulmonary:      Effort: Pulmonary effort is normal. No respiratory distress.      Breath sounds: Normal breath sounds. No wheezing.   Abdominal:      General: Bowel sounds are normal. There is no distension.      Palpations: Abdomen is soft.      Tenderness: There is no abdominal tenderness.   Musculoskeletal:         General: Normal range of motion.      Cervical back: Normal  range of motion and neck supple.   Skin:     General: Skin is warm.   Neurological:      Mental Status: He is alert and oriented to person, place, and time.      Cranial Nerves: No cranial nerve deficit.       LABS AND IMAGING REVIEWED IN EPIC        - CT chest 10/4/21: Interim development of 1 cm subpleural nodule lateral right lower lobe. 4 mm nodule lateral right middle lobe  - PET 1/5/2022: Right middle lobe nodule demonstrates no significant FDG activity and is stable in size   - CT Chest 7/18/2022: Interim increase in size of prior 1 cm right middle lobe subpleural nodule now measuring 2.2 cm.  Interim increase in size of prior 4 mm subpleural nodule lateral right middle lobe now measuring 6 mm. Pinpoint 2 mm subpleural nodules at the left upper and left lower lung. Partial thickening of the left lateral major fissure with the atelectasis and/or scarring at the left lingular segment.      Assessment:     ECOG Performance status: 2    NSCLC / Adenocarcinoma Stage  T N M (Dated: )  - CT chest 10/4/21: Interim development of 1 cm subpleural nodule lateral RLL. 4 mm nodule lateral RML.   - PET 1/5/2022: RML nodule demonstrates no significant FDG activity and is stable in size   - CT Chest 7/18/2022: Interim increase in size of prior 1 cm RML subpleural nodule now measuring 2.2 cm.  Interim increase in size of prior 4 mm subpleural nodule lateral RML lobe now measuring 6 mm. Pinpoint 2 mm subpleural nodules at the left upper and left lower lung. Partial thickening of the left lateral major fissure with the atelectasis and/or scarring at the left lingular segment.  - IR guided biopsy on 8/30/2022 with pathology showing pulmonary ADC.       Plan:     - Ordered PET scan & MRI Brain (Stage IB) for staging    - pt with spine surgery, so will follow up on radiology recommendations. If MRI cannot be done, then will consider CT head with contrast  - Request placed to get NGS panel and PD-L1 status on recent biopsy   -  Referred to Rad onc for evaluation regarding radiation evaluation after the PET and MRI   - Counseled on smoking cessation, nicotine patches prescribed   - RTC in 1 week to follow up on the PET, MRI, Rad Onc recommendations, to plan for further management.        The patient was seen, interviewed and examined. Pertinent lab and radiology studies were reviewed.     Deann Borden MD  Hematology / Oncology

## 2022-09-13 NOTE — PROGRESS NOTES
Subjective:       Patient ID: Tate Gautam is a 62 y.o. male.    Chief Complaint: Pain      Diagnosis:  - NSCLC / Adenocarcinoma     Current Treatment:     Treatment History:      HPI    Tate Gautam  62 y.o.  male with past medical history significant for COPD on home oxygen, CAD s/p CABG, arthritis, IDDM and diabetic neuropathy referred for evaluation of newly diagnosed NSCLC/ADC.    He was followed in [facility] for an abnormal CT of the chest. He was initially seen in October of 2021 & CT chest showed a new, 10 mm nodule & a chronic 4 mm nodule in the RML. Due to his overall poor health follow-up was suggested however patient missed follow-up appointments. He was then seen in July 2022 and had RML lesion biopsy which increased in size from 1 cm to 2.2 cm. 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     Interval History:     Pt is using oxygen at baseline for COPD. He is c/o chest pain, back pain, cough with green phlegm and shortness of breath. He has sharp chest pain and back pain for the last few months, exacerbated by cough and by pressing on it. He has a history of headaches for years and now c/o intermittent dizziness for months. Denies any focal weakness but has chronic neuropathy secondary to DM. Lost 15 lbs over the last 2 months.     Pt was seen by radiation oncology and they recommended chemo-radiation therapy but pt is still thinking about it. He was also considering chemo and had a detailed discussion of benefits and adverse effects. Also explained the risk of progression of the disease, if he decides not to get treatment. Agreed to make a decision soon. Also spoke to radiation oncology who will also follow the pt to plan for chemo radiation therapy, starting next week.       Past Medical History:   Diagnosis Date    AAA (abdominal aortic aneurysm)     Anxiety     Chronic obstructive lung disease     Depression     Diabetes mellitus, type 2     GERD (gastroesophageal  reflux disease)     Hyperlipidemia     Hypertension     Lung mass     On home O2     2-4 L    Spinal stenosis       Past Surgical History:   Procedure Laterality Date    ANGIOPLASTY      BRAIN SURGERY      LUNG BIOPSY Right 08/30/2022    Dr. Shaikh    NECK SURGERY      x3    right 2nd toe amputation       Social History     Socioeconomic History    Marital status: Unknown   Tobacco Use    Smoking status: Every Day     Packs/day: 0.50     Years: 50.00     Pack years: 25.00     Types: Cigarettes    Smokeless tobacco: Never    Tobacco comments:     Now smoking 1/2 PPD   Substance and Sexual Activity    Alcohol use: Not Currently    Drug use: Yes     Types: Marijuana     Comment: 1 joint a day    Sexual activity: Not Currently   Social History Narrative    ** Merged History Encounter **           Family History   Problem Relation Age of Onset    Cancer Mother     Heart disease Father       Review of patient's allergies indicates:   Allergen Reactions    Duloxetine Other (See Comments)     unknown      Review of Systems   Constitutional:  Positive for activity change and unexpected weight change.   HENT: Negative.     Eyes: Negative.    Respiratory:  Positive for cough and shortness of breath.    Cardiovascular:  Positive for chest pain.   Gastrointestinal: Negative.    Endocrine: Negative.    Genitourinary: Negative.    Musculoskeletal:  Positive for arthralgias and back pain.   Neurological:  Positive for dizziness. Negative for weakness.   Hematological: Negative.    Psychiatric/Behavioral: Negative.         Objective:        Vitals:    09/13/22 1324   BP: 123/62   Pulse: (!) 53   Resp: 18   Temp: 98 °F (36.7 °C)          Physical Exam  Constitutional:       Appearance: He is well-developed. He is ill-appearing.   HENT:      Head: Normocephalic and atraumatic.      Mouth/Throat:      Mouth: Mucous membranes are moist.   Eyes:      Conjunctiva/sclera: Conjunctivae normal.   Cardiovascular:      Rate and Rhythm: Normal  rate and regular rhythm.      Heart sounds: Normal heart sounds.   Pulmonary:      Effort: Pulmonary effort is normal. No respiratory distress.      Breath sounds: Normal breath sounds. No wheezing.   Abdominal:      General: Bowel sounds are normal. There is no distension.      Palpations: Abdomen is soft.      Tenderness: There is no abdominal tenderness.   Musculoskeletal:         General: Normal range of motion.      Cervical back: Normal range of motion and neck supple.   Skin:     General: Skin is warm.   Neurological:      Mental Status: He is alert and oriented to person, place, and time.      Cranial Nerves: No cranial nerve deficit.       LABS AND IMAGING REVIEWED IN EPIC        - CT chest 10/4/21: Interim development of 1 cm subpleural nodule lateral right lower lobe. 4 mm nodule lateral right middle lobe  - PET 1/5/2022: Right middle lobe nodule demonstrates no significant FDG activity and is stable in size   - CT Chest 7/18/2022: Interim increase in size of prior 1 cm right middle lobe subpleural nodule now measuring 2.2 cm.  Interim increase in size of prior 4 mm subpleural nodule lateral right middle lobe now measuring 6 mm. Pinpoint 2 mm subpleural nodules at the left upper and left lower lung. Partial thickening of the left lateral major fissure with the atelectasis and/or scarring at the left lingular segment.  - PET 9/8/22: RML hypermetabolc nodule. 2 smaller lesions with no avid activity with larger measure 0.9 x 0.7 cm. Mild uptake with SUV at 3.0 & measure 1.8 x 1.0 cm in right hilum worrisome for metastatic disease.     Assessment:     ECOG Performance status: 2    NSCLC / Adenocarcinoma Stage IIB or IIIA as can be T1c (2.2cm) or T3 (separate tumor nodules in RML, though not avid) N1 M0   - CT chest 10/4/21: Interim development of 1 cm subpleural nodule lateral RLL. 4 mm nodule lateral RML.   - PET 1/5/2022: RML nodule demonstrates no significant FDG activity and is stable in size   - CT Chest  7/18/2022: Interim increase in size of prior 1 cm RML subpleural nodule now measuring 2.2 cm.  Interim increase in size of prior 4 mm subpleural nodule lateral RML lobe now measuring 6 mm. Pinpoint 2 mm subpleural nodules at the left upper and left lower lung. Partial thickening of the left lateral major fissure with the atelectasis and/or scarring at the left lingular segment.  - IR guided biopsy on 8/30/2022 with pathology showing pulmonary ADC.   - PET 9/8/22: RML hypermetabolc nodule. 2 smaller lesions with no avid activity with larger measure 0.9 x 0.7 cm. Mild uptake with SUV at 3.0 & measure 1.8 x 1.0 cm in right hilum worrisome for metastatic disease.   - MRI Brain 9/7/22: no evidence of metastatic disease. Prior right occipitoparietal craniotomy with localized right temporal lobe encephalomalacia.       Palliative care:   - Counseled on smoking cessation, nicotine patches prescribed     Plan:     - Request placed to get NGS panel and PD-L1 status on recent biopsy - pending   - Based on Stage IIB or IIIA with COPD on home oxygen, not a candidate for surgery, but will benefit from C-XRT. Evaluated by Rad onc, planning to start on chemo-radiation therapy - pt would make final decision and will let us know  - pre-cert placed for carboplatin and pemetrexed to be started with C-XRT  - prescribed zofran and phenergan   - RTC in 1 week to follow up and started on chemotherapy     The patient was seen, interviewed and examined. Pertinent lab and radiology studies were reviewed.     Deann Borden MD  Hematology / Oncology

## 2022-09-19 NOTE — PROGRESS NOTES
Subjective:       Patient ID: Tate Gautam is a 62 y.o. male.    Chief Complaint: Follow-up (PCP changed his pain meds.  Not sleeping at night)      Diagnosis:  - NSCLC / Adenocarcinoma     Current Treatment:     Treatment History:      HPI    Tate Gautam  62 y.o.  male with past medical history significant for COPD on home oxygen, CAD s/p CABG, arthritis, IDDM and diabetic neuropathy referred for evaluation of newly diagnosed NSCLC/ADC.    He was followed in [facility] for an abnormal CT of the chest. He was initially seen in October of 2021 & CT chest showed a new, 10 mm nodule & a chronic 4 mm nodule in the RML. Due to his overall poor health follow-up was suggested however patient missed follow-up appointments. He was then seen in July 2022 and had RML lesion biopsy which increased in size from 1 cm to 2.2 cm. 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     Interval History:     Pt is using oxygen at baseline for COPD. He is c/o chest pain, back pain, cough with green phlegm and shortness of breath. He has sharp chest pain and back pain for the last few months, exacerbated by cough and by pressing on it. He has a history of headaches for years and now c/o intermittent dizziness for months. Denies any focal weakness but has chronic neuropathy secondary to DM. Lost 15 lbs over the last 2 months.     Pt was seen by radiation oncology and they recommended chemo-radiation therapy which are they planning to start tomorrow. Discussed the benefits and adverse effects. Pt will be scheduled to be started on chemotherapy at [place].       Past Medical History:   Diagnosis Date    AAA (abdominal aortic aneurysm)     Anxiety     Chronic obstructive lung disease     Depression     Diabetes mellitus, type 2     GERD (gastroesophageal reflux disease)     Hyperlipidemia     Hypertension     Lung mass     On home O2     2-4 L    Spinal stenosis       Past Surgical History:   Procedure Laterality Date     ANGIOPLASTY      BRAIN SURGERY      LUNG BIOPSY Right 08/30/2022    Dr. Shaikh    NECK SURGERY      x3    right 2nd toe amputation       Social History     Socioeconomic History    Marital status: Unknown   Tobacco Use    Smoking status: Every Day     Packs/day: 0.50     Years: 50.00     Pack years: 25.00     Types: Cigarettes    Smokeless tobacco: Never    Tobacco comments:     Now smoking 1/2 PPD   Substance and Sexual Activity    Alcohol use: Not Currently    Drug use: Yes     Types: Marijuana     Comment: 1 joint a day    Sexual activity: Not Currently   Social History Narrative    ** Merged History Encounter **           Family History   Problem Relation Age of Onset    Cancer Mother     Heart disease Father       Review of patient's allergies indicates:   Allergen Reactions    Duloxetine Other (See Comments)     unknown      Review of Systems   Constitutional:  Positive for activity change and unexpected weight change.   HENT: Negative.     Eyes: Negative.    Respiratory:  Positive for cough and shortness of breath.    Cardiovascular:  Positive for chest pain.   Gastrointestinal: Negative.    Endocrine: Negative.    Genitourinary: Negative.    Musculoskeletal:  Positive for arthralgias and back pain.   Neurological:  Positive for dizziness. Negative for weakness.   Hematological: Negative.    Psychiatric/Behavioral: Negative.         Objective:        Vitals:    09/20/22 0907   BP: 112/65   Pulse: (!) 58   Resp: 18   Temp: 97.8 °F (36.6 °C)            Physical Exam  Constitutional:       Appearance: He is well-developed. He is ill-appearing.   HENT:      Head: Normocephalic and atraumatic.      Mouth/Throat:      Mouth: Mucous membranes are moist.   Eyes:      Conjunctiva/sclera: Conjunctivae normal.   Cardiovascular:      Rate and Rhythm: Normal rate and regular rhythm.      Heart sounds: Normal heart sounds.   Pulmonary:      Effort: Pulmonary effort is normal. No respiratory distress.      Breath sounds:  Normal breath sounds. No wheezing.   Abdominal:      General: Bowel sounds are normal. There is no distension.      Palpations: Abdomen is soft.      Tenderness: There is no abdominal tenderness.   Musculoskeletal:         General: Normal range of motion.      Cervical back: Normal range of motion and neck supple.   Skin:     General: Skin is warm.   Neurological:      Mental Status: He is alert and oriented to person, place, and time.      Cranial Nerves: No cranial nerve deficit.       LABS AND IMAGING REVIEWED IN EPIC        - CT chest 10/4/21: Interim development of 1 cm subpleural nodule lateral right lower lobe. 4 mm nodule lateral right middle lobe  - PET 1/5/2022: Right middle lobe nodule demonstrates no significant FDG activity and is stable in size   - CT Chest 7/18/2022: Interim increase in size of prior 1 cm right middle lobe subpleural nodule now measuring 2.2 cm.  Interim increase in size of prior 4 mm subpleural nodule lateral right middle lobe now measuring 6 mm. Pinpoint 2 mm subpleural nodules at the left upper and left lower lung. Partial thickening of the left lateral major fissure with the atelectasis and/or scarring at the left lingular segment.  - PET 9/8/22: RML hypermetabolc nodule. 2 smaller lesions with no avid activity with larger measure 0.9 x 0.7 cm. Mild uptake with SUV at 3.0 & measure 1.8 x 1.0 cm in right hilum worrisome for metastatic disease.     Assessment:     ECOG Performance status: 2    NSCLC / Adenocarcinoma Stage IIB or IIIA as can be T1c (2.2cm) or T3 (separate tumor nodules in RML, though not avid) N1 M0   - CT chest 10/4/21: Interim development of 1 cm subpleural nodule lateral RLL. 4 mm nodule lateral RML.   - PET 1/5/2022: RML nodule demonstrates no significant FDG activity and is stable in size   - CT Chest 7/18/2022: Interim increase in size of prior 1 cm RML subpleural nodule now measuring 2.2 cm.  Interim increase in size of prior 4 mm subpleural nodule lateral RML  lobe now measuring 6 mm. Pinpoint 2 mm subpleural nodules at the left upper and left lower lung. Partial thickening of the left lateral major fissure with the atelectasis and/or scarring at the left lingular segment.  - IR guided biopsy on 8/30/2022 with pathology showing pulmonary ADC.   - PET 9/8/22: RML hypermetabolc nodule. 2 smaller lesions with no avid activity with larger measure 0.9 x 0.7 cm. Mild uptake with SUV at 3.0 & measure 1.8 x 1.0 cm in right hilum worrisome for metastatic disease.   - MRI Brain 9/7/22: no evidence of metastatic disease. Prior right occipitoparietal craniotomy with localized right temporal lobe encephalomalacia.       Palliative care:   - Counseled on smoking cessation, nicotine patches prescribed     Plan:     - Request placed to get NGS panel and PD-L1 status on recent biopsy - pending   - Based on Stage IIB or IIIA with COPD on home oxygen, not a candidate for surgery, but will benefit from C-XRT. Evaluated by Rad onc, planning to start on chemo-radiation therapy tomorrow  - Start with carboplatin and pemetrexed tomorrow at [facility] s/w folic acid and vitamin b12 while being started on pemetrexed.   - prescribed zofran and phenergan   - Chemotherapy tomorrow (09/21/22) at [facility] on 09/29/22 for tox check     The patient was seen, interviewed and examined. Pertinent lab and radiology studies were reviewed.     Deann Borden MD  Hematology / Oncology

## 2022-09-20 PROBLEM — Z51.11 ENCOUNTER FOR ANTINEOPLASTIC CHEMOTHERAPY: Status: ACTIVE | Noted: 2022-01-01

## 2022-09-21 NOTE — PROGRESS NOTES
Subjective:       Patient ID: Tate Gautam is a 62 y.o. male.      CHEMOTHERAPY EDUCATION    Chief Complaint: Lung Cancer (Pt reports no new issues or concerns.)      Diagnosis:  - NSCLC / Adenocarcinoma     Current Treatment:     Treatment History:      Lung Cancer  Associated symptoms include arthralgias, chest pain and coughing. Pertinent negatives include no weakness.     Tate Gautam  62 y.o.  male with past medical history significant for COPD on home oxygen, CAD s/p CABG, arthritis, IDDM and diabetic neuropathy referred for evaluation of newly diagnosed NSCLC/ADC.    He was followed in the [facility] for an abnormal CT of the chest. He was initially seen in October of 2021 & CT chest showed a new, 10 mm nodule & a chronic 4 mm nodule in the RML. Due to his overall poor health follow-up was suggested however patient missed follow-up appointments. He was then seen in July 2022 and had RML lesion biopsy which increased in size from 1 cm to 2.2 cm. 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     Interval History:   Mr. Gautam presents today with his sister for chemotherapy education. He reports feeling well but is understandably anxious regarding his recent diagnosis and treatment. He has started radiation today and will also start chemotherapy with Carboplatin + Alimta today. He will receive B12 injection today and will  folic acid to start today as well. He will  his antiemetics from the pharmacy as well. His chemo will be through peripheral IV.     He uses oxygen at baseline for COPD, but is not wearing it today. At last visit he complained of chest pain, back pain, cough with green phlegm and shortness of breath. He has sharp chest pain and back pain for the last few months, exacerbated by cough and by pressing on it. He has a history of headaches for years and now c/o intermittent dizziness for months. Denies any focal weakness but has chronic neuropathy secondary to  DM. Lost 15 lbs over the last 2 months.       Past Medical History:   Diagnosis Date    AAA (abdominal aortic aneurysm)     Anxiety     Chronic obstructive lung disease     Depression     Diabetes mellitus, type 2     GERD (gastroesophageal reflux disease)     Hyperlipidemia     Hypertension     Lung mass     On home O2     2-4 L    Spinal stenosis       Past Surgical History:   Procedure Laterality Date    ANGIOPLASTY      BRAIN SURGERY      LUNG BIOPSY Right 08/30/2022    Dr. Shaikh    NECK SURGERY      x3    right 2nd toe amputation       Social History     Socioeconomic History    Marital status: Unknown   Tobacco Use    Smoking status: Every Day     Packs/day: 0.50     Years: 50.00     Pack years: 25.00     Types: Cigarettes    Smokeless tobacco: Never    Tobacco comments:     Now smoking 1/2 PPD   Substance and Sexual Activity    Alcohol use: Not Currently    Drug use: Yes     Types: Marijuana     Comment: 1 joint a day    Sexual activity: Not Currently   Social History Narrative    ** Merged History Encounter **           Family History   Problem Relation Age of Onset    Cancer Mother     Heart disease Father       Review of patient's allergies indicates:   Allergen Reactions    Duloxetine Other (See Comments)     unknown      Review of Systems   Constitutional:  Positive for activity change and unexpected weight change.   HENT: Negative.     Eyes: Negative.    Respiratory:  Positive for cough and shortness of breath.    Cardiovascular:  Positive for chest pain.   Gastrointestinal: Negative.    Endocrine: Negative.    Genitourinary: Negative.    Musculoskeletal:  Positive for arthralgias and back pain.   Neurological:  Positive for dizziness. Negative for weakness.   Hematological: Negative.    Psychiatric/Behavioral: Negative.         Objective:        Vitals:    09/21/22 1017   BP: 123/61   Pulse: (!) 55   Resp: 18   Temp: 97.7 °F (36.5 °C)            Physical Exam  Constitutional:       Appearance: He is  well-developed. He is ill-appearing.   HENT:      Head: Normocephalic and atraumatic.      Mouth/Throat:      Mouth: Mucous membranes are moist.   Eyes:      Conjunctiva/sclera: Conjunctivae normal.   Cardiovascular:      Rate and Rhythm: Normal rate and regular rhythm.      Heart sounds: Normal heart sounds.   Pulmonary:      Effort: Pulmonary effort is normal. No respiratory distress.      Breath sounds: Normal breath sounds. No wheezing.   Abdominal:      General: Bowel sounds are normal. There is no distension.      Palpations: Abdomen is soft.      Tenderness: There is no abdominal tenderness.   Musculoskeletal:         General: Normal range of motion.      Cervical back: Normal range of motion and neck supple.   Skin:     General: Skin is warm.   Neurological:      Mental Status: He is alert and oriented to person, place, and time.      Cranial Nerves: No cranial nerve deficit.       LABS AND IMAGING REVIEWED IN EPIC        - CT chest 10/4/21: Interim development of 1 cm subpleural nodule lateral right lower lobe. 4 mm nodule lateral right middle lobe  - PET 1/5/2022: Right middle lobe nodule demonstrates no significant FDG activity and is stable in size   - CT Chest 7/18/2022: Interim increase in size of prior 1 cm right middle lobe subpleural nodule now measuring 2.2 cm.  Interim increase in size of prior 4 mm subpleural nodule lateral right middle lobe now measuring 6 mm. Pinpoint 2 mm subpleural nodules at the left upper and left lower lung. Partial thickening of the left lateral major fissure with the atelectasis and/or scarring at the left lingular segment.  - PET 9/8/22: RML hypermetabolc nodule. 2 smaller lesions with no avid activity with larger measure 0.9 x 0.7 cm. Mild uptake with SUV at 3.0 & measure 1.8 x 1.0 cm in right hilum worrisome for metastatic disease.     Assessment:     ECOG Performance status: 2    NSCLC / Adenocarcinoma Stage IIB or IIIA as can be T1c (2.2cm) or T3 (separate tumor  nodules in RML, though not avid) N1 M0   - CT chest 10/4/21: Interim development of 1 cm subpleural nodule lateral RLL. 4 mm nodule lateral RML.   - PET 1/5/2022: RML nodule demonstrates no significant FDG activity and is stable in size   - CT Chest 7/18/2022: Interim increase in size of prior 1 cm RML subpleural nodule now measuring 2.2 cm.  Interim increase in size of prior 4 mm subpleural nodule lateral RML lobe now measuring 6 mm. Pinpoint 2 mm subpleural nodules at the left upper and left lower lung. Partial thickening of the left lateral major fissure with the atelectasis and/or scarring at the left lingular segment.  - IR guided biopsy on 8/30/2022 with pathology showing pulmonary ADC.   - PET 9/8/22: RML hypermetabolc nodule. 2 smaller lesions with no avid activity with larger measure 0.9 x 0.7 cm. Mild uptake with SUV at 3.0 & measure 1.8 x 1.0 cm in right hilum worrisome for metastatic disease.   - MRI Brain 9/7/22: no evidence of metastatic disease. Prior right occipitoparietal craniotomy with localized right temporal lobe encephalomalacia.       Palliative care:   - Counseled on smoking cessation, nicotine patches prescribed     Plan:     - Request placed to get NGS panel and PD-L1 status on recent biopsy - pending   - Based on Stage IIB or IIIA with COPD on home oxygen, not a candidate for surgery, but will benefit from C-XRT. Evaluated by Rad onc, planning to start on chemo-radiation therapy today with Carboplatin + Alimta. He will receive 6 weeks of radiation.   - Reinforced starting folic acid today and to be taken for at least 3 weeks after the completion of chemo  and vitamin b12 inj to be given today and every 9 weeks while being on pemetrexed.   - Reinforced to pick-up zofran and phenergan to be used PRN.  - RTC on 09/29/22 for tox check and again in 3 weeks for cycle 2.     DISCUSSION:    1.  A total of 60 minutes were spent in counseling today, in which 100% were face-to-face.  At today's  chemotherapy teaching session, we discussed the patient's cancer diagnosis as well as planned chemotherapy regimen, protocol, side effects and toxicities.  A handout of each chemotherapeutic agent in the regimen was provided and reviewed in detail.    2.  Side effects and toxicities of chemotherapy agents included but were not limited to:                a.  Discussed the risk of infection while on chemotherapy related to pancytopenia, specifically a decrease in their white blood cell count.  Instructed to contact our office for temperature >100.5 F, chills, sudden onset cough or shortness of breath, symptoms of a urinary tract infection.                b.  Discussed the risk of anemia. Instructed to contact our office for dizziness, heart palpitations, or extreme or sudden changes in weakness.                c.  Discussed the risk of thrombocytopenia, which increases the risk of bruising or bleeding.  Instructed the patient to contact our office for spontaneous signs of bleeding, including nose bleeds, bleeding from the gums or mouth, blood in sputum, urine or stool and unusual or excessive bruising or rash.                d.  Discussed GI side effects including weight changes, changes in appetite, altered sense of taste, stomatitis, nausea, vomiting, diarrhea, constipation, and heartburn.                e.  Discussed  side effects including painful urination, changes in the amount of urination, possible urine color changes.  Discussed fertility issues and to prevent  pregnancy if of child bearing age.                f.  Discussed neurological side effects including the risk of peripheral neuropathy, either temporary or permanent.                g.  Discussed the potential for skin, hair, and nail changes.       3.  Instructed to contact our office for discussion of medication changes, the addition of vitamin and/or herbal supplementation as they may interact with some chemotherapy agents.    4.  Discussed dietary  modifications and the need to maintain adequate caloric intake and proper oral hydration.  Recommended 64 ounces of fluid per day.    5.  Discussed anti-emetic protocol and bowel regimen protocol.    6.  Office contact information given including after hours number.  Discussed there is an oncologist on call 24/7, 365 days including weekends.  Provided primary nurse's information .  A tour of our infusion room was given.     7. In summary, the patient is in agreement with the plan of care.  Questions appeared to be answered to their satisfaction.  Chemotherapy consent was reviewed and signed.  To be copy scanned into the chart.

## 2022-09-28 NOTE — PROGRESS NOTES
Subjective:       Patient ID: Tate Gautam is a 62 y.o. male.    Chief Complaint: Lung Cancer      Diagnosis:  - NSCLC / Adenocarcinoma     Current Treatment:   - Chemo (carbo/alimta) with radiation therapy (9/19/22 -       Treatment History:      HPI    Tate Guatam  62 y.o.  male with past medical history significant for COPD on home oxygen, CAD s/p CABG, arthritis, IDDM and diabetic neuropathy / retinopathy referred for management of NSCLC/ADC.    He was followed in the [facility] for an abnormal CT of the chest. He was initially seen in October of 2021 & CT chest showed a new, 10 mm nodule & a chronic 4 mm nodule in the RML. Due to his overall poor health follow-up was suggested however patient missed follow-up appointments. He was then seen in July 2022 and had RML lesion biopsy which increased in size from 1 cm to 2.2 cm. 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     Interval History:     He is c/o chest pain, back pain, cough with green phlegm and shortness of breath. He has sharp chest pain and back pain for the last few months, exacerbated by cough and by pressing on it. Denies any focal weakness but has chronic neuropathy secondary to DM. Lost 15 lbs over the last 2 months.     Pt has been getting radiation therapy daily since 9/19/22 with expected last date on 11/1/22. He is c/o being tired after chemo with some nausea but denies any vomiting. He is c/o intermittent tingling in fingers but denies any associated interference with any activities.       Past Medical History:   Diagnosis Date    AAA (abdominal aortic aneurysm)     Anxiety     Chronic obstructive lung disease     Depression     Diabetes mellitus, type 2     GERD (gastroesophageal reflux disease)     Hyperlipidemia     Hypertension     Lung mass     On home O2     2-4 L    Spinal stenosis       Past Surgical History:   Procedure Laterality Date    ANGIOPLASTY      BRAIN SURGERY      LUNG BIOPSY Right 08/30/2022      Shaikh    NECK SURGERY      x3    right 2nd toe amputation       Social History     Socioeconomic History    Marital status: Unknown   Tobacco Use    Smoking status: Every Day     Packs/day: 0.50     Years: 50.00     Pack years: 25.00     Types: Cigarettes    Smokeless tobacco: Never    Tobacco comments:     Now smoking 1/2 PPD   Substance and Sexual Activity    Alcohol use: Not Currently    Drug use: Yes     Types: Marijuana     Comment: 1 joint a day    Sexual activity: Not Currently   Social History Narrative    ** Merged History Encounter **           Family History   Problem Relation Age of Onset    Cancer Mother     Heart disease Father       Review of patient's allergies indicates:   Allergen Reactions    Duloxetine Other (See Comments)     unknown      Review of Systems   Constitutional:  Positive for activity change, fatigue and unexpected weight change.   HENT:  Positive for mouth sores.    Eyes:  Positive for discharge and visual disturbance (Diabetic retinopathy).   Respiratory:  Positive for cough and shortness of breath.    Cardiovascular:  Positive for chest pain.   Gastrointestinal: Negative.    Endocrine: Negative.    Genitourinary: Negative.    Musculoskeletal:  Positive for arthralgias and back pain.   Neurological:  Positive for dizziness. Negative for weakness.   Hematological: Negative.    Psychiatric/Behavioral: Negative.         Objective:        Vitals:    09/29/22 1058   BP: (!) 147/62   Pulse: (!) 59   Resp: 18   Temp: 96.8 °F (36 °C)              Physical Exam  Constitutional:       Appearance: He is well-developed. He is ill-appearing.   HENT:      Head: Normocephalic and atraumatic.      Mouth/Throat:      Mouth: Mucous membranes are moist.   Eyes:      Conjunctiva/sclera: Conjunctivae normal.   Cardiovascular:      Rate and Rhythm: Normal rate and regular rhythm.      Heart sounds: Normal heart sounds.   Pulmonary:      Effort: Pulmonary effort is normal. No respiratory distress.       Breath sounds: Normal breath sounds. No wheezing.   Abdominal:      General: Bowel sounds are normal. There is no distension.      Palpations: Abdomen is soft.      Tenderness: There is no abdominal tenderness.   Musculoskeletal:         General: Normal range of motion.      Cervical back: Normal range of motion and neck supple.   Skin:     General: Skin is warm.   Neurological:      Mental Status: He is alert and oriented to person, place, and time.      Cranial Nerves: No cranial nerve deficit.       LABS AND IMAGING REVIEWED IN EPIC        - CT chest 10/4/21: Interim development of 1 cm subpleural nodule lateral right lower lobe. 4 mm nodule lateral right middle lobe  - PET 1/5/2022: Right middle lobe nodule demonstrates no significant FDG activity and is stable in size   - CT Chest 7/18/2022: Interim increase in size of prior 1 cm right middle lobe subpleural nodule now measuring 2.2 cm.  Interim increase in size of prior 4 mm subpleural nodule lateral right middle lobe now measuring 6 mm. Pinpoint 2 mm subpleural nodules at the left upper and left lower lung. Partial thickening of the left lateral major fissure with the atelectasis and/or scarring at the left lingular segment.  - PET 9/8/22: RML hypermetabolc nodule. 2 smaller lesions with no avid activity with larger measure 0.9 x 0.7 cm. Mild uptake with SUV at 3.0 & measure 1.8 x 1.0 cm in right hilum worrisome for metastatic disease.     Assessment:     ECOG Performance status: 2    NSCLC / Adenocarcinoma Stage IIB or IIIA as can be T1c (2.2cm) or T3 (separate tumor nodules in RML, though not avid) N1 M0   - PD-L1: 85%. Negative for ALK, BRAF, EGFR, ROS1. MSI-S/MMR-P  - CT chest 10/4/21: Interim development of 1 cm subpleural nodule lateral RLL. 4 mm nodule lateral RML.   - PET 1/5/2022: RML nodule demonstrates no significant FDG activity and is stable in size   - CT Chest 7/18/2022: Interim increase in size of prior 1 cm RML subpleural nodule now measuring  2.2 cm.  Interim increase in size of prior 4 mm subpleural nodule lateral RML lobe now measuring 6 mm. Pinpoint 2 mm subpleural nodules at the left upper and left lower lung. Partial thickening of the left lateral major fissure with the atelectasis and/or scarring at the left lingular segment.  - IR guided biopsy on 8/30/2022 with pathology showing pulmonary ADC.   - PET 9/8/22: RML hypermetabolc nodule. 2 smaller lesions with no avid activity with larger measure 0.9 x 0.7 cm. Mild uptake with SUV at 3.0 & measure 1.8 x 1.0 cm in right hilum worrisome for metastatic disease.   - MRI Brain 9/7/22: no evidence of metastatic disease. Prior right occipitoparietal craniotomy with localized right temporal lobe encephalomalacia.       Palliative care:   - Counseled on smoking cessation, nicotine patches prescribed     Hyperkalemia:  - called pt with hyperkalemia, spoke to the sister. Informed her and ask her to go to the ER. Also prescribed kayexalate.       Plan:     - Based on Stage IIB or IIIA with COPD on home oxygen, not a candidate for surgery, but will benefit from C-XRT.   - Started 9/19/22 with expected last date on 11/1/22  - Start with carboplatin and pemetrexed at [facility] - 09/21/22  - s/w folic acid and vitamin b12 while being started on pemetrexed.   - prescribed zofran and phenergan   - RTC in 2 weeks for next cycle of chemo     The patient was seen, interviewed and examined. Pertinent lab and radiology studies were reviewed.     Deann Borden MD  Hematology / Oncology

## 2022-09-29 NOTE — DISCHARGE INSTRUCTIONS
Make sure you eat something when you get home because you did receive 5 units of insulin here your sugar was 270 here.    Continue your other medications the new prescription for the Kayexalate sodium polystyrene you were given today you can start in the morning  Continue your your regular radiation treatments    Return for any acute changes or additional problems

## 2022-09-29 NOTE — ED PROVIDER NOTES
Encounter Date: 9/29/2022       History     Chief Complaint   Patient presents with    Abnormal Lab     Had blood work done this morning and dr called and said his potassium was high     Mr. Tejada sent in the emergency department by his cancer doctor because his lab work today showed his potassium to be 5.9 he was prescribed Kayexalate for it.  But they have not started that medication yet.  He is being treated with radiation every day for non-small cell lung cancer.  He gets his chemo every 3 weeks.  The remainder of his blood work today shoulder sugar 278 but not too many things grossly abnormal.  Considered his active treatment for cancer.  He denies any new symptoms today no fever no chills no nausea no vomiting no chest pain no palpitations no syncope.  He just got the call to come to the hospital so he did.      Social history he is  and then .  He lives alone he is disabled.  He still smokes cigarettes although he has cut back quite a bit he does not drink alcohol he does use marijuana.    Past surgical history hypertension COPD diabetes mellitus hypercholesterolemia non-small cell lung cancer diagnosed with lung biopsy.      Vaccinations for COVID shots.  Pneumonia vaccine up-to-date flu vaccine 2022 tetanus shot none.  Past surgical history is had bypass surgery 3 vessel, biopsy lung by needle.  And the 2nd toe on his right foot amputated.  [facility] he sees Dr. Jewel donnelly.      He is disabled he is  lives alone.  Mom is dead from cancer dad is dead from pneumonia he suffer with heart disease    Review of patient's allergies indicates:   Allergen Reactions    Duloxetine Other (See Comments)     unknown     Past Medical History:   Diagnosis Date    AAA (abdominal aortic aneurysm)     Anxiety     Chronic obstructive lung disease     Depression     Diabetes mellitus, type 2     GERD (gastroesophageal reflux disease)     Hyperlipidemia     Hypertension     Lung  mass     On home O2     2-4 L    Spinal stenosis      Past Surgical History:   Procedure Laterality Date    ANGIOPLASTY      BRAIN SURGERY      LUNG BIOPSY Right 08/30/2022    Dr. Shaikh    NECK SURGERY      x3    right 2nd toe amputation       Family History   Problem Relation Age of Onset    Cancer Mother     Heart disease Father      Social History     Tobacco Use    Smoking status: Every Day     Packs/day: 0.50     Years: 50.00     Pack years: 25.00     Types: Cigarettes    Smokeless tobacco: Never    Tobacco comments:     Now smoking 1/2 PPD   Substance Use Topics    Alcohol use: Not Currently    Drug use: Yes     Types: Marijuana     Comment: 1 joint a day     Review of Systems   Constitutional:  Positive for appetite change and fatigue. Negative for fever.   HENT:  Negative for sore throat.    Eyes: Negative.    Respiratory:  Negative for shortness of breath.    Cardiovascular:  Negative for chest pain.   Gastrointestinal:  Negative for nausea.   Endocrine: Negative.    Genitourinary:  Negative for dysuria.   Musculoskeletal:  Negative for back pain.   Skin:  Negative for rash.        Does get frequent small wounds   Allergic/Immunologic: Negative.    Neurological:  Positive for headaches. Negative for weakness.        Headaches have been slow posterior aspect had a negative brain CT less than 2 weeks ago at   [facility]   Hematological:  Does not bruise/bleed easily.   Psychiatric/Behavioral: Negative.     All other systems reviewed and are negative.    Physical Exam     Initial Vitals [09/29/22 1514]   BP Pulse Resp Temp SpO2   112/63 60 18 97.9 °F (36.6 °C) 98 %      MAP       --         Physical Exam    Nursing note and vitals reviewed.  Constitutional: He appears well-developed and well-nourished.   Pleasant general ambulatory has taken insulin since his blood work was drawn this morning   HENT:   Head: Normocephalic and atraumatic.   Right Ear: Tympanic membrane and external ear normal.   Left Ear:  Tympanic membrane and external ear normal.   Nose: Nose normal.   Mouth/Throat: Oropharynx is clear and moist and mucous membranes are normal. Oral lesions: moist muc memb.   Eyes: Conjunctivae and EOM are normal. Pupils are equal, round, and reactive to light.   Neck: Neck supple. No thyromegaly present. No tracheal deviation present. No JVD present.   Normal range of motion.  Cardiovascular:  Normal rate, regular rhythm, normal heart sounds and intact distal pulses.     Exam reveals no gallop and no friction rub.       No murmur heard.  Pulmonary/Chest: No stridor. No respiratory distress. He has no wheezes. He has rhonchi. He has no rales. He exhibits no tenderness.   Abdominal: Abdomen is soft. Bowel sounds are normal. He exhibits no distension and no mass. No signs of injury. There is no abdominal tenderness.   Genitourinary:    Genitourinary Comments: No CVA tenderness     Musculoskeletal:         General: No tenderness or edema. Normal range of motion.      Cervical back: Normal range of motion and neck supple.     Lymphadenopathy:     He has no cervical adenopathy.   Neurological: He is alert and oriented to person, place, and time. He has normal strength. No cranial nerve deficit or sensory deficit. GCS score is 15. GCS eye subscore is 4. GCS verbal subscore is 5. GCS motor subscore is 6.   Skin: Skin is warm and dry. Capillary refill takes 2 to 3 seconds. No rash noted.   Psychiatric: He has a normal mood and affect. His behavior is normal. Judgment and thought content normal.       ED Course   Procedures  Labs Reviewed   POCT GLUCOSE, HAND-HELD DEVICE - Abnormal; Notable for the following components:       Result Value    POC Glucose 270 (*)     All other components within normal limits     EKG Readings: (Independently Interpreted)   Initial Reading: No STEMI. Rhythm: Sinus Bradycardia. Heart Rate: 59. Other Impression: Possibly consistent with hyperkalemia   Heart rate is 59 per there are slightly  elevated T-waves peaked T-waves throughout    #2 EKG at 1645 normal sinus rhythm normal EKG 69 per I do not think the T-waves are quite as high as they were earlier I feel patient is safe for discharge he has no complaints  This is a normal EKG     Imaging Results    None          Medications   sodium zirconium cyclosilicate packet 10 g (10 g Oral Given 9/29/22 1546)   sodium bicarbonate 8.4 % (1 mEq/mL) injection 50 mEq (50 mEq Intravenous Given 9/29/22 1545)   calcium chloride 0.5 g in dextrose 5 % 100 mL IVPB (0 g Intravenous Stopped 9/29/22 1556)   sodium chloride 0.9% bolus 500 mL (0 mLs Intravenous Stopped 9/29/22 1645)   insulin regular injection 5 Units 0.05 mL (5 Units Intravenous Given 9/29/22 1544)     Medical Decision Making:   Initial Assessment:   Cancer patient multiple comorbidities elevated potassium 5 9 usually not elevated renal function seems to be normal actually creatinine slightly better than the last couple.  Differential Diagnosis:   Hyperkalemia, dehydration, chronic kidney disease possibility, inadequate hydration           ED Course as of 09/29/22 1648   Thu Sep 29, 2022   1639 Second EKG will be done he has received all of his treatments.  I have encouraged him and his sister in the room to take his Kayexalate starting tomorrow morning and encouraged him to drink more fluids   [DM]      ED Course User Index  [DM] Angel Jimenez MD                 Clinical Impression:   Final diagnoses:  [E87.5] Hyperkalemia (Primary)  [C34.90] Malignant neoplasm of lung, unspecified laterality, unspecified part of lung  [E10.69] Type 1 diabetes mellitus with other specified complication  [J44.9] Chronic obstructive pulmonary disease, unspecified COPD type  [Z86.79] History of hypertension        ED Disposition Condition    Discharge Stable          ED Prescriptions    None       Follow-up Information       Follow up With Specialties Details Why Contact Info    Stef Lerma MD Family Medicine In 1  week As needed [address]  [address]  [place] [address]  [phone]               Angel Jimenez MD  09/29/22 1646       Angel Jimenez MD  09/29/22 1648       Angel Jimenez MD  09/29/22 1642

## 2022-10-10 NOTE — PROGRESS NOTES
Subjective:       Patient ID: Tate Gautam is a 62 y.o. male.    Chief Complaint: Lung Cancer      Diagnosis:  - NSCLC / Adenocarcinoma     Current Treatment:   - Chemo (carbo/alimta) with radiation therapy (9/19/22 -       Treatment History:      HPI    Tate Gautam  62 y.o.  male with past medical history significant for COPD on home oxygen, CAD s/p CABG, arthritis, IDDM and diabetic neuropathy / retinopathy referred for management of NSCLC/ADC.    He was followed in [facility] for an abnormal CT of the chest. He was initially seen in October of 2021 & CT chest showed a new, 10 mm nodule & a chronic 4 mm nodule in the RML. Due to his overall poor health follow-up was suggested however patient missed follow-up appointments. He was then seen in July 2022 and had RML lesion biopsy which increased in size from 1 cm to 2.2 cm. 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     Interval History:     Pt has been getting radiation therapy daily since 9/19/22 with expected last date on 11/1/22. He is c/o being tired after chemo with some nausea but denies any vomiting. He is c/o intermittent tingling in fingers but denies any associated interference with any activities.     He is c/o some mouth sores with hoarseness of voice. Also had an episode of falling but denies any loss of consciousness, focal weakness, seizures, chest pain, sob or palpitations. Denies any nausea and vomiting.     Past Medical History:   Diagnosis Date    AAA (abdominal aortic aneurysm)     Anxiety     Chronic obstructive lung disease     Depression     Diabetes mellitus, type 2     GERD (gastroesophageal reflux disease)     Hyperlipidemia     Hypertension     Lung mass     On home O2     2-4 L    Spinal stenosis       Past Surgical History:   Procedure Laterality Date    ANGIOPLASTY      BRAIN SURGERY      LUNG BIOPSY Right 08/30/2022    Dr. Shaikh    NECK SURGERY      x3    right 2nd toe amputation       Social History      Socioeconomic History    Marital status: Unknown   Tobacco Use    Smoking status: Every Day     Packs/day: 0.50     Years: 50.00     Pack years: 25.00     Types: Cigarettes    Smokeless tobacco: Never    Tobacco comments:     Now smoking 1/2 PPD   Substance and Sexual Activity    Alcohol use: Not Currently    Drug use: Yes     Types: Marijuana     Comment: 1 joint a day    Sexual activity: Not Currently   Social History Narrative    ** Merged History Encounter **           Family History   Problem Relation Age of Onset    Cancer Mother     Heart disease Father       Review of patient's allergies indicates:   Allergen Reactions    Duloxetine Other (See Comments)     unknown      Review of Systems   Constitutional:  Positive for activity change, fatigue and unexpected weight change.   HENT:  Positive for mouth sores and voice change.    Eyes:  Positive for discharge and visual disturbance (Diabetic retinopathy).   Respiratory:  Positive for cough and shortness of breath.    Cardiovascular:  Positive for chest pain.   Gastrointestinal: Negative.    Endocrine: Negative.    Genitourinary: Negative.    Musculoskeletal:  Positive for arthralgias and back pain.   Neurological:  Positive for dizziness. Negative for weakness.   Hematological: Negative.    Psychiatric/Behavioral: Negative.         Objective:        Vitals:    10/11/22 1031   BP: 133/80   Pulse: 65   Resp: 18                Physical Exam  Constitutional:       Appearance: He is well-developed. He is not ill-appearing.   HENT:      Head: Normocephalic and atraumatic.      Mouth/Throat:      Mouth: Mucous membranes are moist.      Pharynx: Posterior oropharyngeal erythema present.   Eyes:      Conjunctiva/sclera: Conjunctivae normal.   Cardiovascular:      Rate and Rhythm: Normal rate and regular rhythm.      Heart sounds: Normal heart sounds.   Pulmonary:      Effort: Pulmonary effort is normal. No respiratory distress.      Breath sounds: Normal breath  sounds. No wheezing.   Abdominal:      General: Bowel sounds are normal. There is no distension.      Palpations: Abdomen is soft.      Tenderness: There is no abdominal tenderness.   Musculoskeletal:         General: Normal range of motion.      Cervical back: Normal range of motion and neck supple.   Skin:     General: Skin is warm.   Neurological:      Mental Status: He is alert and oriented to person, place, and time.      Cranial Nerves: No cranial nerve deficit.       LABS AND IMAGING REVIEWED IN EPIC        - CT chest 10/4/21: Interim development of 1 cm subpleural nodule lateral right lower lobe. 4 mm nodule lateral right middle lobe  - PET 1/5/2022: Right middle lobe nodule demonstrates no significant FDG activity and is stable in size   - CT Chest 7/18/2022: Interim increase in size of prior 1 cm right middle lobe subpleural nodule now measuring 2.2 cm.  Interim increase in size of prior 4 mm subpleural nodule lateral right middle lobe now measuring 6 mm. Pinpoint 2 mm subpleural nodules at the left upper and left lower lung. Partial thickening of the left lateral major fissure with the atelectasis and/or scarring at the left lingular segment.  - PET 9/8/22: RML hypermetabolc nodule. 2 smaller lesions with no avid activity with larger measure 0.9 x 0.7 cm. Mild uptake with SUV at 3.0 & measure 1.8 x 1.0 cm in right hilum worrisome for metastatic disease.     Assessment:     ECOG Performance status: 2    NSCLC / Adenocarcinoma Stage IIB or IIIA as can be T1c (2.2cm) or T3 (separate tumor nodules in RML, though not avid) N1 M0   - PD-L1: 85%. Negative for ALK, BRAF, EGFR, ROS1, TrkA/B/C. MSI-S/MMR-P .   - CT chest 10/4/21: Interim development of 1 cm subpleural nodule lateral RLL. 4 mm nodule lateral RML.   - PET 1/5/2022: RML nodule demonstrates no significant FDG activity and is stable in size   - CT Chest 7/18/2022: Interim increase in size of prior 1 cm RML subpleural nodule now measuring 2.2 cm.   Interim increase in size of prior 4 mm subpleural nodule lateral RML lobe now measuring 6 mm. Pinpoint 2 mm subpleural nodules at the left upper and left lower lung. Partial thickening of the left lateral major fissure with the atelectasis and/or scarring at the left lingular segment.  - IR guided biopsy on 8/30/2022 with pathology showing pulmonary ADC.   - PET 9/8/22: RML hypermetabolc nodule. 2 smaller lesions with no avid activity with larger measure 0.9 x 0.7 cm. Mild uptake with SUV at 3.0 & measure 1.8 x 1.0 cm in right hilum worrisome for metastatic disease.   - MRI Brain 9/7/22: no evidence of metastatic disease. Prior right occipitoparietal craniotomy with localized right temporal lobe encephalomalacia.   - Based on Stage IIB or IIIA with COPD on home oxygen, not a candidate for surgery, but will benefit from C-XRT.   - Started 9/19/22 with expected last date on 11/1/22  - Start with carboplatin and pemetrexed at [facility] - 09/21/22  - s/w folic acid and vitamin b12 while being started on pemetrexed.       Palliative care:   - Counseled on smoking cessation, nicotine patches prescribed   - c/w magic mouthwash     Hyperkalemia:  - resolved       Plan:     - labs reviewed, c/w cycle 2   - RTC in 3 weeks for next cycle of chemo     The patient was seen, interviewed and examined. Pertinent lab and radiology studies were reviewed.     Deann Borden MD  Hematology / Oncology

## 2022-10-12 NOTE — TELEPHONE ENCOUNTER
Called and spoke to pt sister (pt point of contact). Pt had chemo today. He is having sore mouth/throat 2/2 XRT and chemo. He is diabetic and is only eating once per day. Having some mild wt loss. Would be interested in oral supplement, agreeable to Glucerna. Will send order and referral to [facility] and plan to touch base with pt/sister next week via phone. Provided my contact info as well.

## 2022-10-21 NOTE — TELEPHONE ENCOUNTER
Called pt sister Estee to check on pt. She reports not much change nutrition-wise. He continues with poor appetite and po intake, fatigue. He has ~7 XRT remaining. I called and spoke to pt directly. He reports no n/v/c/d, just lack of appetite. Notes urinating 2-3 times per 24 hours. Also notes dizziness when standing, falling, severe fatigue. He drinks diet Coke and water. Discussed sports drinks/rehydration beverages such as Pedialyte instead. He does not have any oral supplements on hand (there is an order at [facility])--his sister had the flu this week so was unable to  for him. He has received fluids at ER in the past for dehydration. I advised that he may need ER visit to assess for dehydration again. Messaged Dr. Borden to inform of above and recommend possible fluids in [facility] next week.    He is scheduled to see Dr. Lejeune on 11/1 but pt would prefer to continue care with Dr. Borden in [place]. Messaged [facility] clinic manager to have pt appointments converted to [place] clinic; this was taken care of today.     Pt has clinic contact info should he need.    Berenice Harris MS, RD, , LDN

## 2022-10-31 NOTE — PROGRESS NOTES
"Subjective:       Patient ID: Tate Gautam is a 62 y.o. male.    Chief Complaint: Lung Cancer (2 week f/u with labs-- Patient reports 10/10 generalized pain. Patient reports dizzy spells and when this happens he tends to fall, he reports 3-4 falls in a 3 week span. He reports that at times it feels like he "blacks out" before falling)      Diagnosis:  - NSCLC / Adenocarcinoma     Current Treatment:   - Chemo (carbo/alimta) with radiation therapy (9/19/22 -       Treatment History:    HPI    Tate Gautam  62 y.o.  male with past medical history significant for COPD on home oxygen, CAD s/p CABG, arthritis, IDDM and diabetic neuropathy / retinopathy referred for management of NSCLC/ADC.    He was followed in the [facility] for an abnormal CT of the chest. He was initially seen in October of 2021 & CT chest showed a new, 10 mm nodule & a chronic 4 mm nodule in the RML. Due to his overall poor health follow-up was suggested however patient missed follow-up appointments. He was then seen in July 2022 and had RML lesion biopsy which increased in size from 1 cm to 2.2 cm. 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     Interval History:     Pt has been getting radiation therapy daily since 9/19/22 with expected last date on 11/1/22. He is c/o being tired after chemo today with some nausea but denies any vomiting. He is c/o multiple episodes of dizziness and falling but denies any loss of consciousness, focal weakness, seizures, chest pain, sob or palpitations.  He is also complaining of worsening headache, but does have a history of trauma with encephalomalacia.  He does not want to be treated today and would like to delay it by at least a week     Past Medical History:   Diagnosis Date    AAA (abdominal aortic aneurysm)     Anxiety     Chronic obstructive lung disease     Depression     Diabetes mellitus, type 2     GERD (gastroesophageal reflux disease)     Hyperlipidemia     Hypertension     Lung " mass     On home O2     2-4 L    Spinal stenosis       Past Surgical History:   Procedure Laterality Date    ANGIOPLASTY      BRAIN SURGERY      LUNG BIOPSY Right 08/30/2022    Dr. Shaikh    NECK SURGERY      x3    right 2nd toe amputation       Social History     Socioeconomic History    Marital status: Unknown   Tobacco Use    Smoking status: Every Day     Packs/day: 0.50     Years: 50.00     Pack years: 25.00     Types: Cigarettes    Smokeless tobacco: Never    Tobacco comments:     Now smoking 1/2 PPD   Substance and Sexual Activity    Alcohol use: Not Currently    Drug use: Yes     Types: Marijuana     Comment: 1 joint a day    Sexual activity: Not Currently   Social History Narrative    ** Merged History Encounter **           Family History   Problem Relation Age of Onset    Cancer Mother     Heart disease Father       Review of patient's allergies indicates:   Allergen Reactions    Duloxetine Other (See Comments)     unknown      Review of Systems   Constitutional:  Positive for activity change, fatigue and unexpected weight change.   HENT:  Positive for mouth sores and voice change.    Eyes:  Positive for discharge and visual disturbance (Diabetic retinopathy).   Respiratory:  Positive for cough and shortness of breath.    Cardiovascular:  Positive for chest pain.   Gastrointestinal: Negative.    Endocrine: Negative.    Genitourinary: Negative.    Musculoskeletal:  Positive for arthralgias and back pain.   Neurological:  Positive for dizziness and light-headedness. Negative for weakness.   Hematological: Negative.    Psychiatric/Behavioral: Negative.         Objective:        Vitals:    11/01/22 0856   BP: (!) 147/77   Pulse: 83   Resp: 18   Temp: 98.3 °F (36.8 °C)                  Physical Exam  Constitutional:       Appearance: He is well-developed. He is ill-appearing.   HENT:      Head: Normocephalic and atraumatic.      Mouth/Throat:      Mouth: Mucous membranes are moist.      Pharynx: Posterior  oropharyngeal erythema present.   Eyes:      Conjunctiva/sclera: Conjunctivae normal.   Cardiovascular:      Rate and Rhythm: Normal rate and regular rhythm.      Heart sounds: Normal heart sounds.   Pulmonary:      Effort: Pulmonary effort is normal. No respiratory distress.      Breath sounds: Normal breath sounds. No wheezing.   Abdominal:      General: Bowel sounds are normal. There is no distension.      Palpations: Abdomen is soft.      Tenderness: There is no abdominal tenderness.   Musculoskeletal:         General: Normal range of motion.      Cervical back: Normal range of motion and neck supple.   Skin:     General: Skin is warm.   Neurological:      Mental Status: He is alert and oriented to person, place, and time.      Cranial Nerves: No cranial nerve deficit.       LABS AND IMAGING REVIEWED IN EPIC        - CT chest 10/4/21: Interim development of 1 cm subpleural nodule lateral right lower lobe. 4 mm nodule lateral right middle lobe  - PET 1/5/2022: Right middle lobe nodule demonstrates no significant FDG activity and is stable in size   - CT Chest 7/18/2022: Interim increase in size of prior 1 cm right middle lobe subpleural nodule now measuring 2.2 cm.  Interim increase in size of prior 4 mm subpleural nodule lateral right middle lobe now measuring 6 mm. Pinpoint 2 mm subpleural nodules at the left upper and left lower lung. Partial thickening of the left lateral major fissure with the atelectasis and/or scarring at the left lingular segment.  - PET 9/8/22: RML hypermetabolc nodule. 2 smaller lesions with no avid activity with larger measure 0.9 x 0.7 cm. Mild uptake with SUV at 3.0 & measure 1.8 x 1.0 cm in right hilum worrisome for metastatic disease.     Assessment:     ECOG Performance status: 2    NSCLC / Adenocarcinoma Stage IIB or IIIA as can be T1c (2.2cm) or T3 (separate tumor nodules in RML, though not avid) N1 M0   - PD-L1: 85%. Negative for ALK, BRAF, EGFR, ROS1, TrkA/B/C. MSI-S/MMR-P .    - CT chest 10/4/21: Interim development of 1 cm subpleural nodule lateral RLL. 4 mm nodule lateral RML.   - PET 1/5/2022: RML nodule demonstrates no significant FDG activity and is stable in size   - CT Chest 7/18/2022: Interim increase in size of prior 1 cm RML subpleural nodule now measuring 2.2 cm.  Interim increase in size of prior 4 mm subpleural nodule lateral RML lobe now measuring 6 mm. Pinpoint 2 mm subpleural nodules at the left upper and left lower lung. Partial thickening of the left lateral major fissure with the atelectasis and/or scarring at the left lingular segment.  - IR guided biopsy on 8/30/2022 with pathology showing pulmonary ADC.   - PET 9/8/22: RML hypermetabolc nodule. 2 smaller lesions with no avid activity with larger measure 0.9 x 0.7 cm. Mild uptake with SUV at 3.0 & measure 1.8 x 1.0 cm in right hilum worrisome for metastatic disease.   - MRI Brain 9/7/22: no evidence of metastatic disease. Prior right occipitoparietal craniotomy with localized right temporal lobe encephalomalacia.   - Based on Stage IIB or IIIA with COPD on home oxygen, not a candidate for surgery, but will benefit from C-XRT.   - Started 9/19/22 with expected last date on 11/1/22  - Start with carboplatin and pemetrexed at [place] - 09/21/22  - s/w folic acid and vitamin b12 while being started on pemetrexed.       Palliative care:   - Counseled on smoking cessation, nicotine patches prescribed   - c/w magic mouthwash     Hyperkalemia:  - resolved       Plan:     - labs reviewed but will hold cycle 3 today as patient complaining of extreme fatigue with multiple falls and would like to hold her treatment today.    - MRI of the brain requested for increase in headaches recent history of multiple falls with dizziness  - replete Magnesium and IVF given in the treatment room   - Started with calcium with vitamin-D  - Return to the clinic in 1 week for MD labs and treatment    The patient was seen, interviewed and  examined. Pertinent lab and radiology studies were reviewed.     Deann Borden MD  Hematology / Oncology

## 2022-11-04 NOTE — TELEPHONE ENCOUNTER
----- Message from Yesi Hopper sent at 11/3/2022  4:16 PM CDT -----  Regarding: RE: Scheduling Imaging  MRI Brain scheduled 11/09/22 @ [facility]  Patient scheduled eliceo/ [facility]  ----- Message -----  From: Deann Borden MD  Sent: 11/1/2022   9:28 AM CDT  To: Yesi Hopper  Subject: Scheduling Imaging                               MRI Brain - [facility]

## 2022-11-09 NOTE — PROGRESS NOTES
"Subjective:       Patient ID: Tate Gautam is a 62 y.o. male.      Chief Complaint: Lung Cancer (F/U with labs. Pt reports a lot of nausea and vomiting. Pt reports extreme dizziness. Pt fell on 11/9/22, states "I don't know what happened." Pt having episodes of vertigo in office. )    Patient is a 62 y.o. year old male with oncology and medical history as below.     Interval history: (11/10/2022)  Patient returns for F/U today.  Feeling very well.  He has finished his radiation.  Last week his infusion was missed because of low magnesium.  I ever since then he has been feeling very weak, and a few days ago he was found on the floor by his wife, he has has had and had lost consciousness, he did not go to the ER.  Unfortunately he also fractured his thumb.  Labs reviewed today, and showed acute kidney injury.  He says he had not been eating or drinking very much.  Complains of overall weakness, extreme fatigue, and headaches.    Oncology History   Adenocarcinoma, lung, right   9/2/2022 Initial Diagnosis    Adenocarcinoma, lung, right     9/21/2022 -  Chemotherapy    Treatment Summary   Plan Name: OP NSCLC PEMETREXED + CARBOPLATIN (AUC) Q3W  Treatment Goal: Curative  Status: Active  Start Date: 9/21/2022  End Date: 1/12/2023 (Planned)  Provider: Deann Borden MD  Chemotherapy: CARBOplatin (PARAPLATIN) 410 mg in sodium chloride 0.9% 291 mL chemo infusion, 455 mg (100 % of original dose 455.5 mg), Intravenous, Clinic/HOD 1 time, 2 of 6 cycles  Dose modification:   (original dose 455.5 mg, Cycle 1),   (original dose 505.5 mg, Cycle 2)  Administration: 410 mg (9/21/2022), 505 mg (10/12/2022)  PEMEtrexed disodium (ALIMTA) 1,000 mg in sodium chloride 0.9% 100 mL chemo infusion, 1,025 mg, Intravenous, Clinic/HOD 1 time, 2 of 6 cycles  Administration: 1,000 mg (9/21/2022), 1,000 mg (10/12/2022)         Past Medical History:   Diagnosis Date    AAA (abdominal aortic aneurysm)     Anxiety     Chronic obstructive lung " disease     Depression     Diabetes mellitus, type 2     GERD (gastroesophageal reflux disease)     Hyperlipidemia     Hypertension     Lung mass     On home O2     2-4 L    Spinal stenosis       Review of patient's allergies indicates:   Allergen Reactions    Duloxetine Other (See Comments)     unknown      Current Outpatient Medications on File Prior to Visit   Medication Sig Dispense Refill    fluticasone furoate-vilanteroL (BREO ELLIPTA) 100-25 mcg/dose diskus inhaler Inhale 1 puff into the lungs daily as needed.      (Magic mouthwash) 1:1:1 diphenhydramine(Benadryl) 12.5mg/5ml liq, aluminum & magnesium hydroxide-simethicone (Maalox), LIDOcaine viscous 2% Swish and spit 10 mLs every 4 (four) hours as needed (painful swallowing). for mouth sores 450 mL 3    albuterol (PROVENTIL) 2.5 mg /3 mL (0.083 %) nebulizer solution Take 2.5 mg by nebulization every 4 (four) hours as needed for Wheezing. Rescue      albuterol (PROVENTIL/VENTOLIN HFA) 90 mcg/actuation inhaler Inhale 2 puffs into the lungs 4 (four) times daily. Rescue      amLODIPine (NORVASC) 5 MG tablet Take 5 mg by mouth once daily.      aspirin (ECOTRIN) 81 MG EC tablet Take 81 mg by mouth once daily.      BREO ELLIPTA 200-25 mcg/dose DsDv diskus inhaler       calcium-vitamin D3 (OYSTER SHELL CALCIUM-VIT D3) 500 mg-5 mcg (200 unit) per tablet Take 1 tablet by mouth 2 (two) times daily. 180 tablet 3    carvediloL (COREG) 3.125 MG tablet Take 3.125 mg by mouth 2 (two) times daily.      clopidogreL (PLAVIX) 75 mg tablet Take 75 mg by mouth once daily.      donepeziL (ARICEPT) 10 MG tablet Take 10 mg by mouth once daily.      EScitalopram oxalate (LEXAPRO) 20 MG tablet Take 20 mg by mouth once daily.      folic acid (FOLVITE) 1 MG tablet Take 1 tablet (1 mg total) by mouth once daily. 100 tablet 3    gabapentin (NEURONTIN) 600 MG tablet Take 1,200 mg by mouth 3 (three) times daily.      HUMALOG MIX 75-25 KWIKPEN 100 unit/mL (75-25) InPn Inject 30 Units into the  skin 2 (two) times daily as needed.      JANUVIA 100 mg Tab Take 100 mg by mouth once daily.      lisinopriL (PRINIVIL,ZESTRIL) 20 MG tablet Take 20 mg by mouth once daily.      meclizine (ANTIVERT) 25 mg tablet TAKE ONE TABLET BY MOUTH EVERY 8 HOURS AS NEEDED MAY CAUSE DROWSINESS      metoclopramide HCl (REGLAN) 10 MG tablet Take 10 mg by mouth 2 (two) times daily.      morphine (MS CONTIN) 30 MG 12 hr tablet SMARTSI Tablet(s) By Mouth Every 12 Hours      naloxone (NARCAN) 4 mg/actuation Spry SMARTSIG:Both Nares      ondansetron (ZOFRAN-ODT) 4 MG TbDL Take 2 tablets (8 mg total) by mouth every 12 (twelve) hours as needed (nausea / vomiting). 45 tablet 3    oxyCODONE-acetaminophen (PERCOCET)  mg per tablet Take 1 tablet by mouth every 6 (six) hours as needed.      pantoprazole (PROTONIX) 40 MG tablet Take 40 mg by mouth daily as needed.      promethazine (PHENERGAN) 25 MG tablet Take 1 tablet (25 mg total) by mouth every 6 (six) hours as needed for Nausea. 45 tablet 3    simvastatin (ZOCOR) 80 MG tablet Take 80 mg by mouth nightly.      SYMBICORT 160-4.5 mcg/actuation HFAA SMARTSI Puff(s) Via Inhaler Morning-Evening      water Liqd 150 mL with MILK OF MAGNESIA 400 mg/5 mL Susp 400 mg, diphenhydrAMINE 12.5 mg/5 mL Elix 60 mg, nystatin 100,000 unit/mL Susp 500,000 Units Swish and spit 10 mLs every 4 (four) hours as needed (painful swallowing). for mouth sores       Current Facility-Administered Medications on File Prior to Visit   Medication Dose Route Frequency Provider Last Rate Last Admin    heparin, porcine (PF) 100 unit/mL injection flush 500 Units  500 Units Intravenous PRN Deann Borden MD        heparin, porcine (PF) 100 unit/mL injection flush 500 Units  500 Units Intravenous PRN Deann Borden MD        silver nitrate applicators applicator 2 applicator  2 applicator Topical (Top) Once Carolann Dawson NP        sodium chloride 0.9% flush 10 mL  10 mL Intravenous PRN Deann Borden MD         sodium chloride 0.9% flush 10 mL  10 mL Intravenous PRN Deann Borden MD                     ECOG: 3    Review of Systems  All 12 review of systems negative except as mentioned in HPI.     Physical Exam  Sitting in chair, uncomfortable looking.  AAO x 4.  CV: RRR  Pulm: CTA bilaterally  Right thumb swollen  Complete exam deferred      Lab Visit on 11/10/2022   Component Date Value    Sodium Level 11/10/2022 142     Potassium Level 11/10/2022 4.1     Chloride 11/10/2022 102     Carbon Dioxide 11/10/2022 30     Glucose Level 11/10/2022 151 (H)     Blood Urea Nitrogen 11/10/2022 12.0     Creatinine 11/10/2022 1.31 (H)     Calcium Level Total 11/10/2022 8.2 (L)     Protein Total 11/10/2022 6.8     Albumin Level 11/10/2022 3.7     Globulin 11/10/2022 3.1     Albumin/Globulin Ratio 11/10/2022 1.2     Bilirubin Total 11/10/2022 0.6     Alkaline Phosphatase 11/10/2022 77     Alanine Aminotransferase 11/10/2022 11     Aspartate Aminotransfera* 11/10/2022 20     eGFR 11/10/2022 >60     WBC 11/10/2022 8.1     RBC 11/10/2022 3.98 (L)     Hgb 11/10/2022 11.4 (L)     Hct 11/10/2022 36.1 (L)     MCV 11/10/2022 90.7     MCH 11/10/2022 28.6     MCHC 11/10/2022 31.6 (L)     RDW 11/10/2022 17.6 (H)     Platelet 11/10/2022 265     MPV 11/10/2022 9.8     Neut % 11/10/2022 74.9     Lymph % 11/10/2022 13.0     Mono % 11/10/2022 9.8     Eos % 11/10/2022 1.0     Basophil % 11/10/2022 0.9     Lymph # 11/10/2022 1.05     Neut # 11/10/2022 6.1     Mono # 11/10/2022 0.79     Eos # 11/10/2022 0.08     Baso # 11/10/2022 0.07     IG# 11/10/2022 0.03     IG% 11/10/2022 0.4    Lab Visit on 11/01/2022   Component Date Value    Sodium Level 11/01/2022 140     Potassium Level 11/01/2022 4.3     Chloride 11/01/2022 104     Carbon Dioxide 11/01/2022 29     Glucose Level 11/01/2022 280 (H)     Blood Urea Nitrogen 11/01/2022 13.0     Creatinine 11/01/2022 1.19 (H)     Calcium Level Total 11/01/2022 7.8 (L)     Protein Total 11/01/2022 6.9     Albumin  Level 11/01/2022 3.7     Globulin 11/01/2022 3.2     Albumin/Globulin Ratio 11/01/2022 1.2     Bilirubin Total 11/01/2022 0.4     Alkaline Phosphatase 11/01/2022 82     Alanine Aminotransferase 11/01/2022 16     Aspartate Aminotransfera* 11/01/2022 16     eGFR 11/01/2022 >60     Magnesium Level 11/01/2022 1.30 (L)     WBC 11/01/2022 2.9 (L)     RBC 11/01/2022 3.95 (L)     Hgb 11/01/2022 11.1 (L)     Hct 11/01/2022 35.1 (L)     MCV 11/01/2022 88.9     MCH 11/01/2022 28.1     MCHC 11/01/2022 31.6 (L)     RDW 11/01/2022 15.5     Platelet 11/01/2022 163     MPV 11/01/2022 10.6 (H)     IG# 11/01/2022 0.02     IG% 11/01/2022 0.7     Neut Man 11/01/2022 61     Band Neutrophil Man 11/01/2022 1     Lymph Man 11/01/2022 29     Monocyte Man 11/01/2022 8     Eos Man 11/01/2022 1     Abs Neut calc 11/01/2022 1.798 (L)     Abs Mono 11/01/2022 0.232     Abs Lymp 11/01/2022 0.841     Abs Eos  11/01/2022 0.029     RBC Morph 11/01/2022 Abnormal (A)     Macrocyte 11/01/2022 1+ (A)     Hypochrom 11/01/2022 1+ (A)     Ovalocytes 11/01/2022 Slight (A)     Platelet Est 11/01/2022 Adequate               Problem list:  No diagnosis found.     Assessment:       Head trauma with LOC 2/2 to fall  - Given persistent headaches, dizziness, will send  to the ED to r/o acute hemorrhage  - Right thumb swollen as well from fall, asked to get X-RAY in the ED.     2. FAISAL  - worsened since last visit, will need to get fluids in ED    3. NSCLC  - Finished with RT  - Will have patient return to clinic in 2 weeks to plan for next cycle  - Will plan for 4 - 6 cycles of carbo - pem total followed by durvalumab      Treatment Plan Information   OP NSCLC PEMETREXED + CARBOPLATIN (AUC) Q3W   Deann Borden MD   Upcoming Treatment Dates - OP NSCLC PEMETREXED + CARBOPLATIN (AUC) Q3W    11/10/2022       Chemotherapy       PEMEtrexed disodium (ALIMTA) 1,025 mg in sodium chloride 0.9% 100 mL chemo infusion       CARBOplatin (PARAPLATIN) in sodium chloride 0.9% 250  mL chemo infusion       Antiemetics       aprepitant (CINVANTI) injection 130 mg       palonosetron (ALOXI) 0.25 mg with Dexamethasone (DECADRON) 12 mg in NS 50 mL IVPB  12/1/2022       Chemotherapy       PEMEtrexed disodium (ALIMTA) 1,025 mg in sodium chloride 0.9% 100 mL chemo infusion       CARBOplatin (PARAPLATIN) in sodium chloride 0.9% 250 mL chemo infusion       cyanocobalamin injection 1,000 mcg       Supportive Care       cyanocobalamin injection 1,000 mcg       Antiemetics       aprepitant (CINVANTI) injection 130 mg       palonosetron (ALOXI) 0.25 mg with Dexamethasone (DECADRON) 12 mg in NS 50 mL IVPB  12/22/2022       Chemotherapy       PEMEtrexed disodium (ALIMTA) 1,025 mg in sodium chloride 0.9% 100 mL chemo infusion       CARBOplatin (PARAPLATIN) in sodium chloride 0.9% 250 mL chemo infusion       Antiemetics       APREPITANT 7.2 MG/ML IV EMUL       PALONOSETRON 0.25MG/DEXAMETHASONE 12MG ORDERABLE  1/12/2023       Chemotherapy       PEMEtrexed disodium (ALIMTA) 1,025 mg in sodium chloride 0.9% 100 mL chemo infusion       CARBOplatin (PARAPLATIN) in sodium chloride 0.9% 250 mL chemo infusion       Antiemetics       APREPITANT 7.2 MG/ML IV EMUL       PALONOSETRON 0.25MG/DEXAMETHASONE 12MG ORDERABLE    Therapy Plan Information  None                   I personally reviewed all pertinent imaging, lab results, explained to the patient the pertinent information in detail including radiographic imaging, abnormal lab results. All questions were answered thoroughly. Total time spent on this visit was >45 minutes.    Yg Phillips M.D.   Hematology / Oncology

## 2022-11-10 NOTE — ED PROVIDER NOTES
11/10/2022         10:13 AM    Source of History:  History obtained from the patient.       Chief complaint:  From Nurse Triage:  Fatigue (Brought in by sister due to probable syncopal episode yesterday, pt woke up on the floor and doesn't remember what happened.  Pt also vomiting for about 10 hours yesterday. Pt is a lung CA pt who missed his last 2 treatments of chemo and was told he needed fluids but also did not come for that. Pt also has right thumb pain from the fall yesterday.)    HPI:  Tate Gautam is a 62 y.o. male presenting with Fatigue (Brought in by sister due to probable syncopal episode yesterday, pt woke up on the floor and doesn't remember what happened.  Pt also vomiting for about 10 hours yesterday. Pt is a lung CA pt who missed his last 2 treatments of chemo and was told he needed fluids but also did not come for that. Pt also has right thumb pain from the fall yesterday.)       Patient had syncopal episode and fall yesterday after throwing up for about 10 hours, today was seen by oncology and they told him that his kidney function is not good, and he needs to go to the emergency room to get IV fluids.  And also to do a CT head to make sure is not bleeding in the head.    Review of Systems   Constitutional symptoms:  No Fever. No Chills    Skin symptoms:  No Rash.    Eye symptoms:  No Visual disturbance reported.   ENMT symptoms:  No Sore throat,    Respiratory symptoms:  No Shortness of Breath, no Cough, no Wheezing.    Cardiovascular symptoms:  No Chest Pain, No Palpitations, No Tachycardia.    Gastrointestinal symptoms:  No Abdominal Pain, No Nausea, Vomiting yesterday, No Diarrhea, No Constipation.    Genitourinary symptoms:  No Dysuria,    Musculoskeletal symptoms:  No Back pain,  pain in the right thumb  Neurologic symptoms:  No Headache, No Dizziness.    Psychiatric symptoms:  No Anxiety, No Depression, No Substance Abuse.              Additional review of systems information: Patient  Denies Any Other Complaints.  All Other Systems Reviewed With Patient And Negative.    ALLEGIES:  Review of patient's allergies indicates:   Allergen Reactions    Duloxetine Other (See Comments)     unknown       MEDICINE LIST:  Current Outpatient Medications   Medication Instructions    (Magic mouthwash) 1:1:1 diphenhydramine(Benadryl) 12.5mg/5ml liq, aluminum & magnesium hydroxide-simethicone (Maalox), LIDOcaine viscous 2% 10 mLs, Swish & Spit, Every 4 hours PRN, for mouth sores    albuterol (PROVENTIL) 2.5 mg, Nebulization, Every 4 hours PRN, Rescue    albuterol (PROVENTIL/VENTOLIN HFA) 90 mcg/actuation inhaler 2 puffs, Inhalation, 4 times daily, Rescue    amLODIPine (NORVASC) 5 mg, Oral, Daily    aspirin (ECOTRIN) 81 mg, Oral, Daily    BREO ELLIPTA 200-25 mcg/dose DsDv diskus inhaler No dose, route, or frequency recorded.    calcium-vitamin D3 (OYSTER SHELL CALCIUM-VIT D3) 500 mg-5 mcg (200 unit) per tablet 1 tablet, Oral, 2 times daily    carvediloL (COREG) 3.125 mg, Oral, 2 times daily    clopidogreL (PLAVIX) 75 mg, Oral, Daily    donepeziL (ARICEPT) 10 mg, Oral, Daily    EScitalopram oxalate (LEXAPRO) 20 mg, Oral, Daily    fluticasone furoate-vilanteroL (BREO ELLIPTA) 100-25 mcg/dose diskus inhaler 1 puff, Inhalation, Daily PRN    folic acid (FOLVITE) 1 mg, Oral, Daily    gabapentin (NEURONTIN) 1,200 mg, Oral, 3 times daily    HUMALOG MIX 75-25 KWIKPEN 100 unit/mL (75-25) InPn 30 Units, Subcutaneous, 2 times daily PRN    JANUVIA 100 mg, Oral, Daily    lisinopriL (PRINIVIL,ZESTRIL) 20 mg, Oral, Daily    meclizine (ANTIVERT) 25 mg tablet TAKE ONE TABLET BY MOUTH EVERY 8 HOURS AS NEEDED MAY CAUSE DROWSINESS    metoclopramide HCl (REGLAN) 10 mg, Oral, 2 times daily    morphine (MS CONTIN) 30 MG 12 hr tablet SMARTSI Tablet(s) By Mouth Every 12 Hours    naloxone (NARCAN) 4 mg/actuation Spry SMARTSIG:Both Nares    ondansetron (ZOFRAN-ODT) 8 mg, Oral, Every 12 hours PRN    oxyCODONE-acetaminophen (PERCOCET)   mg per tablet 1 tablet, Oral, Every 6 hours PRN    pantoprazole (PROTONIX) 40 mg, Oral, Daily PRN    promethazine (PHENERGAN) 25 mg, Oral, Every 6 hours PRN    simvastatin (ZOCOR) 80 mg, Oral, Nightly    SYMBICORT 160-4.5 mcg/actuation HFAA SMARTSI Puff(s) Via Inhaler Morning-Evening    water Liqd 150 mL with MILK OF MAGNESIA 400 mg/5 mL Susp 400 mg, diphenhydrAMINE 12.5 mg/5 mL Elix 60 mg, nystatin 100,000 unit/mL Susp 500,000 Units Swish and spit 10 mLs every 4 (four) hours as needed (painful swallowing). for mouth sores        PMH:  As per HPI and below:    Reviewed and updated in chart.    PAST MEDICAL HISTORY:  Past Medical History:   Diagnosis Date    AAA (abdominal aortic aneurysm)     Anxiety     Chronic obstructive lung disease     Depression     Diabetes mellitus, type 2     GERD (gastroesophageal reflux disease)     Hyperlipidemia     Hypertension     Lung mass     On home O2     2-4 L    Spinal stenosis         PAST SURGICAL HISTORY:  Past Surgical History:   Procedure Laterality Date    ANGIOPLASTY      BRAIN SURGERY      Trauma/ Subdural.    LUNG BIOPSY Right 2022    Dr. Shaikh    NECK SURGERY      x3    right 2nd toe amputation         SOCIAL HISTORY:  Social History     Tobacco Use    Smoking status: Every Day     Packs/day: 0.50     Years: 50.00     Pack years: 25.00     Types: Cigarettes    Smokeless tobacco: Never    Tobacco comments:     Now smoking 1/2 PPD   Substance Use Topics    Alcohol use: Not Currently    Drug use: Yes     Types: Marijuana     Comment: 1 joint a day       FAMILY HISTORY:  Family History   Problem Relation Age of Onset    Cancer Mother     Heart disease Father         PROBLEM LIST:  Patient Active Problem List   Diagnosis    Gastroesophageal reflux disease    Acute on chronic respiratory failure    Arteriosclerosis of coronary artery    Arthritis    Diabetes mellitus    Hyperlipidemia    Hypertension    Hypoxia    Lung mass    Pain of sternum    Tobacco user     Type 2 diabetes mellitus with diabetic toe ulcer    Non-pressure chronic ulcer of other part of right foot with fat layer exposed    Adenocarcinoma, lung, right    Encounter for antineoplastic chemotherapy        PHYSICAL EXAM:      ED Triage Vitals [11/10/22 0946]   /73   Pulse 69   Resp 18   Temp 98.5 °F (36.9 °C)   SpO2 95 %        Vital Signs: Reviewed As In Chart.  General:  Alert, No Cardiorespiratory Distress Noted.   Skin: Normal For Ethnic Origin  Eye:  Extraocular Movements Are Intact.   ENT: Mucus membranes are moist.   Cardiovascular:  Regular Rate And Rhythm, No Murmur, No Pedal Edema.    Respiratory:  Respirations Nonlabored, No Respiratory Distress, Good Bilateral Air Entry, No Rales, No Rhonchi.    Musculoskeletal:  No Gross Deformity Noted.  Tenderness and some swelling of the right thumb, mainly involving MCP and the PIP joint  Gastrointestinal:  Soft, Non Distended, Non Tenderness, Normal Bowel Sounds.    Neurological:  Alert And Oriented To Person, Place, Time, And Situation, Normal Motor Observed, Normal Speech Observed.    Psychiatric:  Cooperative, Appropriate Mood & Affect.    INITIAL IMPRESSION/ DIFFERENTIAL DX:      MEDICAL DECISION MAKING:      Reviewed Nurses Note. Reviewed Vital Signs.     Reviewed Pertinent old records, History and updated as necessary.    62 y.o. male with Fatigue (Brought in by sister due to probable syncopal episode yesterday, pt woke up on the floor and doesn't remember what happened.  Pt also vomiting for about 10 hours yesterday. Pt is a lung CA pt who missed his last 2 treatments of chemo and was told he needed fluids but also did not come for that. Pt also has right thumb pain from the fall yesterday.)    Patient with history of CA lung has missed his two chemotherapies in the past, had nausea and vomiting yesterday and had a syncopal episode and is hurting in the right thumb, they do not know the circumstances of his fall, I am going to do a CT head,  x-ray of the thumb, he already had the blood work done on him his creatinine is 1.3 and I will do a cardiac workup.    ED WORKUP AND COURSE:  ED ORDERS:  Orders Placed This Encounter   Procedures    CT Head Without Contrast    X-Ray Hand 3 View Right    Troponin I    TSH    Urinalysis, Reflex to Urine Culture Urine, Clean Catch    Ethanol    Drug Screen, Urine    COVID/FLU A&B PCR    Urinalysis, Microscopic    Cardiac Monitoring - Adult    Tele: Maintain IV access while on telemetry - Adult    EKG 12-lead       Medications   sodium chloride 0.9% bolus 2,448 mL (2,448 mLs Intravenous New Bag 11/10/22 1044)            ECG Results              EKG 12-lead (Final result)  Result time 11/10/22 12:44:23      Final result by Interface, Lab In [facility] (11/10/22 12:44:23)                   Narrative:    Test Reason : R55,    Vent. Rate : 068 BPM     Atrial Rate : 068 BPM     P-R Int : 152 ms          QRS Dur : 088 ms      QT Int : 458 ms       P-R-T Axes : 076 052 067 degrees     QTc Int : 487 ms    Normal sinus rhythm  Prolonged QT  Abnormal ECG  When compared with ECG of 29-SEP-2022 16:42,  No significant change was found  Confirmed by Mariano Ochoa MD ([id number]) on 11/10/2022 12:44:10 PM    Referred By: GLORIAERR   SELF           Confirmed By:Mariano Ochoa MD                      ED Interpretation by Ravinder Conner MD (11/10/22 10:24:15, [facility] - Emergency Dept, Emergency Medicine)    EKG Initial Reading: Interpreted by Ravinder Conner MD. independently as: No STEMI. Rhythm: Normal Sinus Rhythm, Rate 68. Ectopy: No Ectopy. Conduction:  Prolonged QT. ST Segments:  No STEMI, Normal ST Segments. T Waves: Normal. Axis: Normal. Clinical Impression: Normal Sinus Rhythm Other                                     ED LABS ORDERED AND REVIEWED:  Admission on 11/10/2022   Component Date Value Ref Range Status    Troponin-I 11/10/2022 0.010  0.000 - 0.045 ng/mL Final    Thyroid Stimulating Hormone 11/10/2022 0.6704   0.3500 - 4.9400 uIU/mL Final    Color, UA 11/10/2022 Yellow  Yellow, Light-Yellow, Dark Yellow, Annette, Straw Final    Appearance, UA 11/10/2022 Clear  Clear Final    Specific Gravity, UA 11/10/2022 1.015   Final    pH, UA 11/10/2022 6.5  5.0 - 8.5 Final    Protein, UA 11/10/2022 Negative  Negative mg/dL Final    Glucose, UA 11/10/2022 Negative  Negative, Normal mg/dL Final    Ketones, UA 11/10/2022 Negative  Negative mg/dL Final    Blood, UA 11/10/2022 Trace-Intact (A)  Negative unit/L Final    Bilirubin, UA 11/10/2022 Negative  Negative mg/dL Final    Urobilinogen, UA 11/10/2022 1.0  0.2, 1.0, Normal mg/dL Final    Nitrites, UA 11/10/2022 Negative  Negative Final    Leukocyte Esterase, UA 11/10/2022 Negative  Negative unit/L Final    Ethanol Level 11/10/2022 <10.0  <=10.0 mg/dL Final    Amphetamines, Urine 11/10/2022 Negative  Negative Final    Barbituates, Urine 11/10/2022 Negative  Negative Final    Benzodiazepine, Urine 11/10/2022 Negative  Negative Final    Cannabinoids, Urine 11/10/2022 Positive (A)  Negative Final    Cocaine, Urine 11/10/2022 Negative  Negative Final    Fentanyl, Urine 11/10/2022 Positive (A)  Negative Final    MDMA, Urine 11/10/2022 Negative  Negative Final    Opiates, Urine 11/10/2022 Positive (A)  Negative Final    Phencyclidine, Urine 11/10/2022 Negative  Negative Final    pH, Urine 11/10/2022 6.5  3.0 - 11.0 Final    Specific Gravity, Urine Auto 11/10/2022 1.015  1.001 - 1.035 Final    Influenza A PCR 11/10/2022 Not Detected  Not Detected Final    Influenza B PCR 11/10/2022 Not Detected  Not Detected Final    SARS-CoV-2 PCR 11/10/2022 Not Detected  Not Detected Final    Bacteria, UA 11/10/2022 None Seen  None Seen, Rare, Occasional /HPF Final    RBC, UA 11/10/2022 0-2  None Seen, 0-2, 3-5, 0-5 /HPF Final    WBC, UA 11/10/2022 None Seen  None Seen, 0-2, 3-5, 0-5 /HPF Final    Squamous Epithelial Cells, UA 11/10/2022 Rare  None Seen, Rare, Occasional, Occ /HPF Final   Lab Visit on  11/10/2022   Component Date Value Ref Range Status    Sodium Level 11/10/2022 142  136 - 145 mmol/L Final    Potassium Level 11/10/2022 4.1  3.5 - 5.1 mmol/L Final    Chloride 11/10/2022 102  98 - 107 mmol/L Final    Carbon Dioxide 11/10/2022 30  23 - 31 mmol/L Final    Glucose Level 11/10/2022 151 (H)  82 - 115 mg/dL Final    Blood Urea Nitrogen 11/10/2022 12.0  8.4 - 25.7 mg/dL Final    Creatinine 11/10/2022 1.31 (H)  0.73 - 1.18 mg/dL Final    Calcium Level Total 11/10/2022 8.2 (L)  8.8 - 10.0 mg/dL Final    Protein Total 11/10/2022 6.8  5.8 - 7.6 gm/dL Final    Albumin Level 11/10/2022 3.7  3.4 - 4.8 gm/dL Final    Globulin 11/10/2022 3.1  2.4 - 3.5 gm/dL Final    Albumin/Globulin Ratio 11/10/2022 1.2  1.1 - 2.0 ratio Final    Bilirubin Total 11/10/2022 0.6  <=1.5 mg/dL Final    Alkaline Phosphatase 11/10/2022 77  40 - 150 unit/L Final    Alanine Aminotransferase 11/10/2022 11  0 - 55 unit/L Final    Aspartate Aminotransferase 11/10/2022 20  5 - 34 unit/L Final    eGFR 11/10/2022 >60  mls/min/1.73/m2 Final    WBC 11/10/2022 8.1  4.5 - 11.5 x10(3)/mcL Final    RBC 11/10/2022 3.98 (L)  4.70 - 6.10 x10(6)/mcL Final    Hgb 11/10/2022 11.4 (L)  14.0 - 18.0 gm/dL Final    Hct 11/10/2022 36.1 (L)  42.0 - 52.0 % Final    MCV 11/10/2022 90.7  80.0 - 94.0 fL Final    MCH 11/10/2022 28.6  27.0 - 31.0 pg Final    MCHC 11/10/2022 31.6 (L)  33.0 - 36.0 mg/dL Final    RDW 11/10/2022 17.6 (H)  11.5 - 17.0 % Final    Platelet 11/10/2022 265  130 - 400 x10(3)/mcL Final    MPV 11/10/2022 9.8  7.4 - 10.4 fL Final    Neut % 11/10/2022 74.9  % Final    Lymph % 11/10/2022 13.0  % Final    Mono % 11/10/2022 9.8  % Final    Eos % 11/10/2022 1.0  % Final    Basophil % 11/10/2022 0.9  % Final    Lymph # 11/10/2022 1.05  0.6 - 4.6 x10(3)/mcL Final    Neut # 11/10/2022 6.1  2.1 - 9.2 x10(3)/mcL Final    Mono # 11/10/2022 0.79  0.1 - 1.3 x10(3)/mcL Final    Eos # 11/10/2022 0.08  0 - 0.9 x10(3)/mcL Final    Baso # 11/10/2022 0.07  0 - 0.2  x10(3)/mcL Final    IG# 11/10/2022 0.03  0 - 0.04 x10(3)/mcL Final    IG% 11/10/2022 0.4  % Final       RADIOLOGY STUDIES ORDERED AND REVIEWED:  Imaging Results              CT Head Without Contrast (Final result)  Result time 11/10/22 10:57:19      Final result by Angel Black MD (11/10/22 10:57:19)                   Impression:      1. No definite acute intracranial abnormality identified  2. Post craniotomy changes again evident at the right parietooccipital bone with resulting beam hardening artifact limiting evaluation  3. Parenchymal defect again evident at the inferolateral right temporal lobe compatible with encephalomalacia/postsurgical changes which has a similar appearance to the prior exam  4. Mucosal thickening at the left maxillary sinus and ethmoid sinuses      Electronically signed by: Angel Black  Date:    11/10/2022  Time:    10:57               Narrative:    EXAMINATION:  CT HEAD WITHOUT CONTRAST    CLINICAL HISTORY:  Dizziness, persistent/recurrent, cardiac or vascular cause suspected;, .    TECHNIQUE:  PATIENT RADIATION DOSE: DLP(mGycm) 927    As per PQRS measures, all CT scans at this facility used dose modulation, iterative reconstruction, and/or weight based dose adjustment when appropriate to reduce radiation dose to as low as reasonably achievable.    COMPARISON:  11/24/2020    FINDINGS:  Serial axial images were obtained of the head without the administration of IV contrast. Both brain and bone parenchymal windows were obtained.  Additional coronal and sagittal reconstructions were obtained.  Ventricles, cisterns, and sulci are prominent in size.  Post craniotomy changes again evident at the right parietal occipital bone and resulting beam hardening artifact.  There is no obvious evidence of intracranial hemorrhage, midline shift, mass effect, or abnormal extra-axial fluid collections.  A small parenchymal defect is again evident at the lateral inferior right temporal lobe compatible  with the encephalomalacia/postsurgical changes which has a similar appearance to the prior exam.  Mild scattered hypodensities are seen within the periventricular white matter.  Cerebellar tonsils extend caudally to the level of foramen magnum.  There is mucosal thickening at the left maxillary sinus and ethmoid sinuses bilaterally.  Mastoid air cells are aerated bilaterally.  External auditory canals are grossly patent.                                       X-Ray Hand 3 View Right (Final result)  Result time 11/10/22 11:33:35      Final result by Angel Black MD (11/10/22 11:33:35)                   Impression:      1. No acute osseous defect identified  2. Mild deformity at the 5th metacarpal which may represent old injury.  Clinical correlation is indicated  3. Osteoarthritis  4. MR examination would allow further evaluation if clinically imdicated      Electronically signed by: Angel Black  Date:    11/10/2022  Time:    11:33               Narrative:    EXAMINATION:  XR HAND COMPLETE 3 VIEW RIGHT    CLINICAL HISTORY:  ; Thumb pain;.    COMPARISON:  None available.    FINDINGS:  AP, lateral, and obliques views revealno acute fracture or dislocation.  There is mild deformity of the 5th metacarpal which may represent old fracture/injury.  There is narrowing of the D IP and PIP joints.Arthritic changes are evident at the 1st metacarpal-carpal joint.                                      ED COURSE AND REEVALUATIONS:  Vitals:    11/10/22 1101   BP: (!) 162/76   Pulse: 70   Resp: 10   Temp:        PROCEDURES PERFORMED IN ED:  Procedures    ED Course as of 11/10/22 1256   Thu Nov 10, 2022   1249 Patient's workup in the emergency room does not reveal any major abnormality, is not having any acute MI, his urine is clean, CT head is nothing acute, flu and COVID test is negative, he does have some fentanyl in the urine apart from cannabis, and he also has opiates in the urine which he is prescribed, I have advised him  to be careful and possibly if he wants to do marijuana he can always possibly get a prescription because he has a cancer.  I instead of buying it on the street.  His thumb does not have any fractures, I will let him go home.  I have given him 30 mL/kg bolus his kidney function is stable, actually is better than last month, [GQ]      ED Course User Index  [GQ] Ravinder Conner MD              DIAGNOSTIC IMPRESSION:      1. Generalized weakness    2. Syncope         ED Disposition Condition    Discharge Stable               Medication List        ASK your doctor about these medications      (MAGIC MOUTHWASH) 1:1:1 BENADRYL 12.5MG/5ML LIQ, ALUMINUM & MAGNESIUM  Swish and spit 10 mLs every 4 (four) hours as needed (painful swallowing). for mouth sores     * albuterol 90 mcg/actuation inhaler  Commonly known as: PROVENTIL/VENTOLIN HFA     * albuterol 2.5 mg /3 mL (0.083 %) nebulizer solution  Commonly known as: PROVENTIL     amLODIPine 5 MG tablet  Commonly known as: NORVASC     aspirin 81 MG EC tablet  Commonly known as: ECOTRIN     * BREO ELLIPTA 100-25 mcg/dose diskus inhaler  Generic drug: fluticasone furoate-vilanteroL     * BREO ELLIPTA 200-25 mcg/dose Dsdv diskus inhaler  Generic drug: fluticasone furoate-vilanteroL     calcium-vitamin D3 500 mg-5 mcg (200 unit) per tablet  Commonly known as: OYSTER SHELL CALCIUM-VIT D3  Take 1 tablet by mouth 2 (two) times daily.     carvediloL 3.125 MG tablet  Commonly known as: COREG     clopidogreL 75 mg tablet  Commonly known as: PLAVIX     donepeziL 10 MG tablet  Commonly known as: ARICEPT     EScitalopram oxalate 20 MG tablet  Commonly known as: LEXAPRO     folic acid 1 MG tablet  Commonly known as: FOLVITE  Take 1 tablet (1 mg total) by mouth once daily.     gabapentin 600 MG tablet  Commonly known as: NEURONTIN     HumaLOG Mix 75-25 KwikPen 100 unit/mL (75-25) Inpn  Generic drug: insulin lispro protamin-lispro     JANUVIA 100 MG Tab  Generic drug: SITagliptin phosphate      lisinopriL 20 MG tablet  Commonly known as: PRINIVIL,ZESTRIL     meclizine 25 mg tablet  Commonly known as: ANTIVERT     metoclopramide HCl 10 MG tablet  Commonly known as: REGLAN     morphine 30 MG 12 hr tablet  Commonly known as: MS CONTIN     naloxone 4 mg/actuation Spry  Commonly known as: NARCAN     ondansetron 4 MG Tbdl  Commonly known as: ZOFRAN-ODT  Take 2 tablets (8 mg total) by mouth every 12 (twelve) hours as needed (nausea / vomiting).     oxyCODONE-acetaminophen  mg per tablet  Commonly known as: PERCOCET     pantoprazole 40 MG tablet  Commonly known as: PROTONIX     promethazine 25 MG tablet  Commonly known as: PHENERGAN  Take 1 tablet (25 mg total) by mouth every 6 (six) hours as needed for Nausea.     simvastatin 80 MG tablet  Commonly known as: ZOCOR     SYMBICORT 160-4.5 mcg/actuation Hfaa  Generic drug: budesonide-formoterol 160-4.5 mcg     water Liqd 150 mL with MILK OF MAGNESIA 400 mg/5 mL Susp 400 mg, diphenhydrAMINE 12.5 mg/5 mL Elix 60 mg, nystatin 100,000 unit/mL Susp 500,000 Units           * This list has 4 medication(s) that are the same as other medications prescribed for you. Read the directions carefully, and ask your doctor or other care provider to review them with you.                    Follow-up Information       Stef Lerma MD In 2 days.    Specialty: Family Medicine  Contact information:  [address]  [place] [address]  [phone]                              ED Prescriptions    None       Follow-up Information       Follow up With Specialties Details Why Contact Info    Stef Lerma MD Family Medicine In 2 days  [address]  [place] [address]  [phone]                 Ravinder Conner MD  11/10/22 5706

## 2022-11-18 NOTE — PROGRESS NOTES
Subjective:       Patient ID: Tate Gautam is a 62 y.o. male.    Chief Complaint: Lung Cancer (1 week f/u with labs-- Patient reports no new concerns )      Diagnosis:  - NSCLC / Adenocarcinoma     Current Treatment:   - Chemo (carbo/alimta) with radiation therapy (9/19/22 -       Treatment History:    HPI    Tate Gautam  62 y.o.  male with past medical history significant for COPD on home oxygen, CAD s/p CABG, arthritis, IDDM and diabetic neuropathy / retinopathy referred for management of NSCLC/ADC.    He was followed in the [facility] for an abnormal CT of the chest. He was initially seen in October of 2021 & CT chest showed a new, 10 mm nodule & a chronic 4 mm nodule in the RML. Due to his overall poor health follow-up was suggested however patient missed follow-up appointments. He was then seen in July 2022 and had RML lesion biopsy which increased in size from 1 cm to 2.2 cm. 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     Interval History:     Pt has completed his radiation therapy.  Patient treatment was held last week because of being feeling tired fatigued and with a fall.  CT scan of the head done which did not show any acute finding.  He could not do the MRI and is refusing it now.    Patient here today for treatment.  Denies any fever, nausea, vomiting, diarrhea, numbness, falls and has been feeling better.  Discussed option of the continuation of the treatments and patient would like to continue with the treatment for now     Past Medical History:   Diagnosis Date    AAA (abdominal aortic aneurysm)     Anxiety     Chronic obstructive lung disease     Depression     Diabetes mellitus, type 2     GERD (gastroesophageal reflux disease)     Hyperlipidemia     Hypertension     Lung mass     On home O2     2-4 L    Spinal stenosis       Past Surgical History:   Procedure Laterality Date    ANGIOPLASTY      BRAIN SURGERY      Trauma/ Subdural.    LUNG BIOPSY Right 08/30/2022      Sh[facility]    NECK SURGERY      x3    right 2nd toe amputation       Social History     Socioeconomic History    Marital status: Unknown   Tobacco Use    Smoking status: Every Day     Packs/day: 0.50     Years: 50.00     Pack years: 25.00     Types: Cigarettes    Smokeless tobacco: Never    Tobacco comments:     Now smoking 1/2 PPD   Substance and Sexual Activity    Alcohol use: Not Currently    Drug use: Yes     Types: Marijuana     Comment: 1 joint a day    Sexual activity: Not Currently   Social History Narrative    ** Merged History Encounter **           Family History   Problem Relation Age of Onset    Cancer Mother     Heart disease Father       Review of patient's allergies indicates:   Allergen Reactions    Duloxetine Other (See Comments)     unknown      Review of Systems   Constitutional:  Positive for fatigue and unexpected weight change. Negative for activity change.   HENT:  Positive for voice change. Negative for mouth sores.    Eyes:  Positive for visual disturbance (Diabetic retinopathy). Negative for discharge.   Respiratory:  Positive for cough. Negative for shortness of breath.    Cardiovascular:  Positive for chest pain.   Gastrointestinal: Negative.    Endocrine: Negative.    Genitourinary: Negative.    Musculoskeletal:  Positive for arthralgias. Negative for back pain.   Neurological:  Negative for dizziness, weakness and light-headedness.   Hematological: Negative.    Psychiatric/Behavioral: Negative.         Objective:        Vitals:    11/21/22 1051   BP: 133/74   Pulse: 60   Resp: 18   Temp: 97.5 °F (36.4 °C)                  Physical Exam  Constitutional:       General: He is not in acute distress.     Appearance: He is well-developed. He is not ill-appearing.   HENT:      Head: Normocephalic and atraumatic.      Mouth/Throat:      Mouth: Mucous membranes are moist.   Eyes:      Extraocular Movements: Extraocular movements intact.      Conjunctiva/sclera: Conjunctivae normal.    Cardiovascular:      Rate and Rhythm: Normal rate and regular rhythm.      Heart sounds: Normal heart sounds.   Pulmonary:      Effort: Pulmonary effort is normal. No respiratory distress.      Breath sounds: Normal breath sounds. No wheezing.   Abdominal:      General: Bowel sounds are normal. There is no distension.      Palpations: Abdomen is soft.      Tenderness: There is no abdominal tenderness.   Musculoskeletal:         General: Normal range of motion.      Cervical back: Normal range of motion and neck supple.   Skin:     General: Skin is warm.   Neurological:      General: No focal deficit present.      Mental Status: He is alert and oriented to person, place, and time. Mental status is at baseline.      Cranial Nerves: No cranial nerve deficit.   Psychiatric:         Mood and Affect: Mood normal.       LABS AND IMAGING REVIEWED IN EPIC        - CT chest 10/4/21: Interim development of 1 cm subpleural nodule lateral right lower lobe. 4 mm nodule lateral right middle lobe  - PET 1/5/2022: Right middle lobe nodule demonstrates no significant FDG activity and is stable in size   - CT Chest 7/18/2022: Interim increase in size of prior 1 cm right middle lobe subpleural nodule now measuring 2.2 cm.  Interim increase in size of prior 4 mm subpleural nodule lateral right middle lobe now measuring 6 mm. Pinpoint 2 mm subpleural nodules at the left upper and left lower lung. Partial thickening of the left lateral major fissure with the atelectasis and/or scarring at the left lingular segment.  - PET 9/8/22: RML hypermetabolc nodule. 2 smaller lesions with no avid activity with larger measure 0.9 x 0.7 cm. Mild uptake with SUV at 3.0 & measure 1.8 x 1.0 cm in right hilum worrisome for metastatic disease.     Assessment:     ECOG Performance status: 3    NSCLC / Adenocarcinoma Stage IIB or IIIA as can be T1c (2.2cm) or T3 (separate tumor nodules in RML, though not avid) N1 M0   - PD-L1: 85%. Negative for ALK, BRAF,  EGFR, ROS1, TrkA/B/C. MSI-S/MMR-P .   - CT chest 10/4/21: 1 cm subpleural RLL nodule. 4 mm lateral RML nodule .   - PET 1/5/2022: RML nodule demonstrates no significant FDG activity and is stable in size   - CT Chest 7/18/2022: Interim increase in size of prior 1 cm RML subpleural nodule now measuring 2.2 cm.  Interim increase in size of prior 4 mm subpleural nodule lateral RML lobe now measuring 6 mm. Pinpoint 2 mm subpleural nodules at the left upper and left lower lung. Partial thickening of the left lateral major fissure with the atelectasis and/or scarring at the left lingular segment.  - IR guided biopsy on 8/30/2022 with pathology showing pulmonary ADC.   - PET 9/8/22: RML hypermetabolc nodule. 2 smaller lesions with no avid activity with larger measure 0.9 x 0.7 cm. Mild uptake with SUV at 3.0 & measure 1.8 x 1.0 cm in right hilum worrisome for metastatic disease.   - MRI Brain 9/7/22: no evidence of metastatic disease. Prior right occipitoparietal craniotomy with localized right temporal lobe encephalomalacia.   - Based on Stage IIB or IIIA with COPD on home oxygen, not a candidate for surgery, but will benefit from C-XRT.   - Started 9/19/22 with expected last date on 11/1/22  - Start with carboplatin and pemetrexed at [place] - 09/21/22  - s/w folic acid and vitamin b12 while being started on pemetrexed.       Palliative care:   - Counseled on smoking cessation, nicotine patches prescribed   - c/w magic mouthwash     Hyperkalemia:  - resolved       Plan:     - labs reviewed will treat with cycle 3 today   - MRI brain requested , but pt refused to have it done   - replete Magnesium   - c/w calcium with vitamin-D  - Insulin 10 U given as pt missed his home dose today and now noted to have hyperglycemia  - s/w protonix   - Return to the clinic in 3 week for MD labs and treatment    The patient was seen, interviewed and examined. Pertinent lab and radiology studies were reviewed.     Deann Borden,  MD  Hematology / Oncology

## 2022-11-21 PROBLEM — E83.42 HYPOMAGNESEMIA: Status: ACTIVE | Noted: 2022-01-01

## 2022-11-22 NOTE — PLAN OF CARE
Pt will not have any falls during outpatient treatment. Pt will be assisted on and off scale to prevent falls.

## 2022-12-13 NOTE — PROGRESS NOTES
Subjective:       Patient ID: Tate Gautam is a 62 y.o. male.    Chief Complaint: Lung Cancer (3 week f/u with labs-- Patient reports right rib pain, reports that the pain is not new but the intensity of the pain is. States that he is taking Percocet as prescribed.)        Diagnosis:  - NSCLC / Adenocarcinoma     Current Treatment:   - Chemo (carbo/alimta) with radiation therapy (9/19/22 - 11/2/2022)      Treatment History:    HPI    Tate Gautam  62 y.o.  male with past medical history significant for COPD on home oxygen, CAD s/p CABG, arthritis, IDDM and diabetic neuropathy / retinopathy referred for management of NSCLC/ADC.    He was followed in the [facility] for an abnormal CT of the chest. He was initially seen in October of 2021 & CT chest showed a new, 10 mm nodule & a chronic 4 mm nodule in the RML. Due to his overall poor health follow-up was suggested however patient missed follow-up appointments. He was then seen in July 2022 and had RML lesion biopsy which increased in size from 1 cm to 2.2 cm. 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     Interval History:     Pt has completed his radiation therapy 11/2/22  .Patient here today for treatment.  Denies any fever, nausea, vomiting, diarrhea, numbness. He states that he does have worsening right rib pain. His appetite varies. He states that does have some fatigue after treatment. Watery eyes and nose continuous.   Discussed option of the continuation of the treatments and patient would like to continue with the treatment for now     Past Medical History:   Diagnosis Date    AAA (abdominal aortic aneurysm)     Anxiety     Chronic obstructive lung disease     Depression     Diabetes mellitus, type 2     GERD (gastroesophageal reflux disease)     Hyperlipidemia     Hypertension     Lung mass     On home O2     2-4 L    Spinal stenosis       Past Surgical History:   Procedure Laterality Date    ANGIOPLASTY      BRAIN SURGERY       Trauma/ Subdural.    LUNG BIOPSY Right 08/30/2022    Dr. Shaikh    NECK SURGERY      x3    right 2nd toe amputation       Social History     Socioeconomic History    Marital status: Unknown   Tobacco Use    Smoking status: Every Day     Packs/day: 0.25     Years: 50.00     Pack years: 12.50     Types: Cigarettes    Smokeless tobacco: Never    Tobacco comments:     Now smoking 1/2 PPD   Substance and Sexual Activity    Alcohol use: Not Currently    Drug use: Yes     Types: Marijuana     Comment: 1 joint a day    Sexual activity: Not Currently   Social History Narrative    ** Merged History Encounter **           Family History   Problem Relation Age of Onset    Cancer Mother     Heart disease Father       Review of patient's allergies indicates:   Allergen Reactions    Duloxetine Other (See Comments)     unknown      Review of Systems   Constitutional:  Positive for fatigue. Negative for activity change.   HENT:  Negative for mouth sores.    Eyes:  Positive for visual disturbance (Diabetic retinopathy). Negative for discharge.   Respiratory:  Negative for shortness of breath.    Cardiovascular:  Positive for chest pain.   Gastrointestinal: Negative.    Endocrine: Negative.    Genitourinary: Negative.    Musculoskeletal:  Negative for back pain.        Right rib pain    Neurological:  Negative for dizziness, weakness and light-headedness.   Hematological: Negative.    Psychiatric/Behavioral: Negative.         Objective:        Vitals:    12/13/22 0845   BP: 107/60   Pulse: 67   Resp: 18   Temp: 97.6 °F (36.4 °C)                  Physical Exam  Constitutional:       General: He is not in acute distress.     Appearance: He is well-developed. He is not ill-appearing.   HENT:      Head: Normocephalic and atraumatic.      Right Ear: External ear normal.      Left Ear: External ear normal.      Mouth/Throat:      Mouth: Mucous membranes are moist.   Eyes:      Extraocular Movements: Extraocular movements intact.       Conjunctiva/sclera: Conjunctivae normal.   Cardiovascular:      Rate and Rhythm: Normal rate and regular rhythm.      Heart sounds: Normal heart sounds.   Pulmonary:      Effort: Pulmonary effort is normal. No respiratory distress.      Breath sounds: Normal breath sounds. No wheezing.   Abdominal:      General: Bowel sounds are normal. There is no distension.      Palpations: Abdomen is soft.      Tenderness: There is no abdominal tenderness.   Musculoskeletal:         General: Normal range of motion.      Cervical back: Normal range of motion and neck supple.   Skin:     General: Skin is warm.   Neurological:      General: No focal deficit present.      Mental Status: He is alert and oriented to person, place, and time. Mental status is at baseline.      Cranial Nerves: No cranial nerve deficit.   Psychiatric:         Mood and Affect: Mood normal.       LABS AND IMAGING REVIEWED IN EPIC        - CT chest 10/4/21: Interim development of 1 cm subpleural nodule lateral right lower lobe. 4 mm nodule lateral right middle lobe  - PET 1/5/2022: Right middle lobe nodule demonstrates no significant FDG activity and is stable in size   - CT Chest 7/18/2022: Interim increase in size of prior 1 cm right middle lobe subpleural nodule now measuring 2.2 cm.  Interim increase in size of prior 4 mm subpleural nodule lateral right middle lobe now measuring 6 mm. Pinpoint 2 mm subpleural nodules at the left upper and left lower lung. Partial thickening of the left lateral major fissure with the atelectasis and/or scarring at the left lingular segment.  - PET 9/8/22: RML hypermetabolc nodule. 2 smaller lesions with no avid activity with larger measure 0.9 x 0.7 cm. Mild uptake with SUV at 3.0 & measure 1.8 x 1.0 cm in right hilum worrisome for metastatic disease.     Assessment:     ECOG Performance status: 3    NSCLC / Adenocarcinoma Stage IIB or IIIA as can be T1c (2.2cm) or T3 (separate tumor nodules in RML, though not avid) N1 M0    - PD-L1: 85%. Negative for ALK, BRAF, EGFR, ROS1, TrkA/B/C. MSI-S/MMR-P .   - CT chest 10/4/21: 1 cm subpleural RLL nodule. 4 mm lateral RML nodule .   - PET 1/5/2022: RML nodule demonstrates no significant FDG activity and is stable in size   - CT Chest 7/18/2022: Interim increase in size of prior 1 cm RML subpleural nodule now measuring 2.2 cm.  Interim increase in size of prior 4 mm subpleural nodule lateral RML lobe now measuring 6 mm. Pinpoint 2 mm subpleural nodules at the left upper and left lower lung. Partial thickening of the left lateral major fissure with the atelectasis and/or scarring at the left lingular segment.  - IR guided biopsy on 8/30/2022 with pathology showing pulmonary ADC.   - PET 9/8/22: RML hypermetabolc nodule. 2 smaller lesions with no avid activity with larger measure 0.9 x 0.7 cm. Mild uptake with SUV at 3.0 & measure 1.8 x 1.0 cm in right hilum worrisome for metastatic disease.   - MRI Brain 9/7/22: no evidence of metastatic disease. Prior right occipitoparietal craniotomy with localized right temporal lobe encephalomalacia.   - Based on Stage IIB or IIIA with COPD on home oxygen, not a candidate for surgery, but will benefit from C-XRT.   - Started 9/19/22 with expected last date on 11/1/22  - Start with carboplatin and pemetrexed at [place] - 09/21/22  - s/w folic acid and vitamin b12 while being started on pemetrexed.       Palliative care:   - Counseled on smoking cessation, nicotine patches prescribed   - c/w magic mouthwash     Hyperkalemia:  - resolved     Decrease appetite  -nutrition consult  -Ramez Vang referral for supplements     Pain  Right rib   -will consult pain management       Plan:     - labs reviewed will treat with cycle 4 today 12/13/22  -will add fluids to treatment to see if this helps   - MRI brain requested , but pt refused to have it done - states will need an open MRI   - c/w calcium with vitamin-D  -CT chest scheduled for 1/17/22,     - Return to the  clinic in 3 week for provider visit, labs and treatment    The patient was seen, interviewed and examined. Pertinent lab and radiology studies were reviewed.     Pt instructed to call in the interim for any new or worsening concerns or symptoms     SYD Gomez   Hematology / Oncology

## 2023-01-01 ENCOUNTER — OFFICE VISIT (OUTPATIENT)
Dept: HEMATOLOGY/ONCOLOGY | Facility: CLINIC | Age: 63
End: 2023-01-01
Payer: MEDICARE

## 2023-01-01 ENCOUNTER — HOSPITAL ENCOUNTER (OUTPATIENT)
Dept: RADIOLOGY | Facility: HOSPITAL | Age: 63
Discharge: HOME | End: 2023-06-28
Attending: NURSE PRACTITIONER
Payer: MEDICARE

## 2023-01-01 ENCOUNTER — LAB VISIT (OUTPATIENT)
Dept: LAB | Facility: HOSPITAL | Age: 63
End: 2023-01-01
Payer: MEDICARE

## 2023-01-01 ENCOUNTER — INFUSION (OUTPATIENT)
Dept: INFUSION THERAPY | Facility: HOSPITAL | Age: 63
End: 2023-01-01
Payer: MEDICARE

## 2023-01-01 ENCOUNTER — CLINICAL SUPPORT (OUTPATIENT)
Dept: HEMATOLOGY/ONCOLOGY | Facility: CLINIC | Age: 63
End: 2023-01-01
Payer: MEDICARE

## 2023-01-01 ENCOUNTER — LAB VISIT (OUTPATIENT)
Dept: LAB | Facility: HOSPITAL | Age: 63
End: 2023-01-01
Attending: INTERNAL MEDICINE
Payer: MEDICARE

## 2023-01-01 ENCOUNTER — HOSPITAL ENCOUNTER (OUTPATIENT)
Dept: RADIOLOGY | Facility: HOSPITAL | Age: 63
Discharge: HOME | End: 2023-04-20
Attending: INTERNAL MEDICINE
Payer: MEDICARE

## 2023-01-01 ENCOUNTER — DOCUMENTATION ONLY (OUTPATIENT)
Dept: HEMATOLOGY/ONCOLOGY | Facility: CLINIC | Age: 63
End: 2023-01-01
Payer: MEDICARE

## 2023-01-01 ENCOUNTER — INFUSION (OUTPATIENT)
Dept: INFUSION THERAPY | Facility: HOSPITAL | Age: 63
End: 2023-01-01
Attending: NURSE PRACTITIONER
Payer: MEDICARE

## 2023-01-01 ENCOUNTER — TELEPHONE (OUTPATIENT)
Dept: HEMATOLOGY/ONCOLOGY | Facility: CLINIC | Age: 63
End: 2023-01-01
Payer: MEDICARE

## 2023-01-01 ENCOUNTER — HOSPITAL ENCOUNTER (OUTPATIENT)
Dept: RADIOLOGY | Facility: HOSPITAL | Age: 63
Discharge: HOME | End: 2023-06-21
Attending: NURSE PRACTITIONER
Payer: MEDICARE

## 2023-01-01 ENCOUNTER — HOSPITAL ENCOUNTER (OUTPATIENT)
Dept: RADIOLOGY | Facility: HOSPITAL | Age: 63
Discharge: HOME | End: 2023-01-17
Attending: HOSPITALIST
Payer: MEDICARE

## 2023-01-01 VITALS
BODY MASS INDEX: 26.29 KG/M2 | WEIGHT: 183.63 LBS | HEIGHT: 70 IN | TEMPERATURE: 98 F | DIASTOLIC BLOOD PRESSURE: 64 MMHG | SYSTOLIC BLOOD PRESSURE: 99 MMHG | RESPIRATION RATE: 20 BRPM | HEART RATE: 77 BPM | OXYGEN SATURATION: 95 %

## 2023-01-01 VITALS
OXYGEN SATURATION: 94 % | TEMPERATURE: 98 F | HEIGHT: 70 IN | HEART RATE: 76 BPM | RESPIRATION RATE: 18 BRPM | DIASTOLIC BLOOD PRESSURE: 75 MMHG | BODY MASS INDEX: 26.41 KG/M2 | SYSTOLIC BLOOD PRESSURE: 117 MMHG | WEIGHT: 184.5 LBS

## 2023-01-01 VITALS
TEMPERATURE: 98 F | HEIGHT: 70 IN | OXYGEN SATURATION: 93 % | BODY MASS INDEX: 25.05 KG/M2 | DIASTOLIC BLOOD PRESSURE: 71 MMHG | SYSTOLIC BLOOD PRESSURE: 119 MMHG | RESPIRATION RATE: 20 BRPM | WEIGHT: 175 LBS | HEART RATE: 64 BPM

## 2023-01-01 VITALS
RESPIRATION RATE: 20 BRPM | SYSTOLIC BLOOD PRESSURE: 97 MMHG | OXYGEN SATURATION: 96 % | HEIGHT: 70 IN | WEIGHT: 183.38 LBS | HEART RATE: 71 BPM | TEMPERATURE: 98 F | DIASTOLIC BLOOD PRESSURE: 63 MMHG | BODY MASS INDEX: 26.25 KG/M2

## 2023-01-01 VITALS
TEMPERATURE: 98 F | WEIGHT: 168.44 LBS | HEIGHT: 70 IN | RESPIRATION RATE: 18 BRPM | BODY MASS INDEX: 24.11 KG/M2 | OXYGEN SATURATION: 95 % | DIASTOLIC BLOOD PRESSURE: 73 MMHG | SYSTOLIC BLOOD PRESSURE: 123 MMHG | HEART RATE: 75 BPM

## 2023-01-01 VITALS
TEMPERATURE: 98 F | BODY MASS INDEX: 26.2 KG/M2 | HEART RATE: 66 BPM | DIASTOLIC BLOOD PRESSURE: 70 MMHG | WEIGHT: 183 LBS | SYSTOLIC BLOOD PRESSURE: 147 MMHG | HEIGHT: 70 IN | RESPIRATION RATE: 18 BRPM | OXYGEN SATURATION: 99 %

## 2023-01-01 VITALS
WEIGHT: 166 LBS | BODY MASS INDEX: 23.77 KG/M2 | HEART RATE: 74 BPM | DIASTOLIC BLOOD PRESSURE: 67 MMHG | HEIGHT: 70 IN | SYSTOLIC BLOOD PRESSURE: 104 MMHG | TEMPERATURE: 98 F | OXYGEN SATURATION: 96 % | RESPIRATION RATE: 18 BRPM

## 2023-01-01 VITALS
SYSTOLIC BLOOD PRESSURE: 131 MMHG | OXYGEN SATURATION: 95 % | DIASTOLIC BLOOD PRESSURE: 73 MMHG | RESPIRATION RATE: 18 BRPM | HEIGHT: 70 IN | WEIGHT: 174.19 LBS | BODY MASS INDEX: 24.94 KG/M2 | HEART RATE: 60 BPM | TEMPERATURE: 97 F

## 2023-01-01 VITALS
WEIGHT: 183 LBS | BODY MASS INDEX: 26.2 KG/M2 | SYSTOLIC BLOOD PRESSURE: 147 MMHG | HEIGHT: 70 IN | TEMPERATURE: 98 F | RESPIRATION RATE: 18 BRPM | DIASTOLIC BLOOD PRESSURE: 70 MMHG | OXYGEN SATURATION: 99 % | HEART RATE: 66 BPM

## 2023-01-01 VITALS
HEART RATE: 56 BPM | OXYGEN SATURATION: 95 % | DIASTOLIC BLOOD PRESSURE: 71 MMHG | WEIGHT: 177 LBS | RESPIRATION RATE: 20 BRPM | TEMPERATURE: 97 F | HEIGHT: 70 IN | SYSTOLIC BLOOD PRESSURE: 144 MMHG | BODY MASS INDEX: 25.34 KG/M2

## 2023-01-01 VITALS
SYSTOLIC BLOOD PRESSURE: 131 MMHG | HEART RATE: 60 BPM | TEMPERATURE: 97 F | OXYGEN SATURATION: 95 % | DIASTOLIC BLOOD PRESSURE: 73 MMHG | RESPIRATION RATE: 18 BRPM | HEIGHT: 70 IN | WEIGHT: 174.19 LBS | BODY MASS INDEX: 24.94 KG/M2

## 2023-01-01 VITALS
HEART RATE: 67 BPM | RESPIRATION RATE: 18 BRPM | WEIGHT: 185.63 LBS | SYSTOLIC BLOOD PRESSURE: 132 MMHG | HEIGHT: 70 IN | DIASTOLIC BLOOD PRESSURE: 73 MMHG | TEMPERATURE: 98 F | BODY MASS INDEX: 26.57 KG/M2

## 2023-01-01 DIAGNOSIS — Z51.11 CHEMOTHERAPY MANAGEMENT, ENCOUNTER FOR: ICD-10-CM

## 2023-01-01 DIAGNOSIS — C34.91 ADENOCARCINOMA, LUNG, RIGHT: ICD-10-CM

## 2023-01-01 DIAGNOSIS — C34.91 ADENOCARCINOMA, LUNG, RIGHT: Primary | ICD-10-CM

## 2023-01-01 DIAGNOSIS — E55.9 VITAMIN D DEFICIENCY: ICD-10-CM

## 2023-01-01 DIAGNOSIS — R09.02 HYPOXIA: ICD-10-CM

## 2023-01-01 DIAGNOSIS — E83.42 HYPOMAGNESEMIA: Primary | ICD-10-CM

## 2023-01-01 DIAGNOSIS — E87.5 HYPERKALEMIA: ICD-10-CM

## 2023-01-01 DIAGNOSIS — J18.9 PNEUMONIA DUE TO INFECTIOUS ORGANISM, UNSPECIFIED LATERALITY, UNSPECIFIED PART OF LUNG: ICD-10-CM

## 2023-01-01 DIAGNOSIS — Z51.11 ENCOUNTER FOR ANTINEOPLASTIC CHEMOTHERAPY: ICD-10-CM

## 2023-01-01 DIAGNOSIS — C78.01 MALIGNANT NEOPLASM METASTATIC TO BOTH LUNGS: ICD-10-CM

## 2023-01-01 DIAGNOSIS — H04.209 EXCESSIVE TEAR PRODUCTION, UNSPECIFIED LATERALITY: ICD-10-CM

## 2023-01-01 DIAGNOSIS — C78.02 MALIGNANT NEOPLASM METASTATIC TO BOTH LUNGS: ICD-10-CM

## 2023-01-01 DIAGNOSIS — R63.0 DECREASE IN APPETITE: ICD-10-CM

## 2023-01-01 DIAGNOSIS — R53.1 WEAKNESS: ICD-10-CM

## 2023-01-01 DIAGNOSIS — R53.83 FATIGUE, UNSPECIFIED TYPE: ICD-10-CM

## 2023-01-01 DIAGNOSIS — C77.1 METASTASIS TO INTRATHORACIC LYMPH NODES: ICD-10-CM

## 2023-01-01 DIAGNOSIS — Z51.11 ENCOUNTER FOR ANTINEOPLASTIC CHEMOTHERAPY: Primary | ICD-10-CM

## 2023-01-01 DIAGNOSIS — C78.02 MALIGNANT NEOPLASM METASTATIC TO BOTH LUNGS: Primary | ICD-10-CM

## 2023-01-01 DIAGNOSIS — E83.42 HYPOMAGNESEMIA: ICD-10-CM

## 2023-01-01 DIAGNOSIS — D64.9 ANEMIA, UNSPECIFIED TYPE: ICD-10-CM

## 2023-01-01 DIAGNOSIS — Z72.0 TOBACCO USER: ICD-10-CM

## 2023-01-01 DIAGNOSIS — C78.01 MALIGNANT NEOPLASM METASTATIC TO BOTH LUNGS: Primary | ICD-10-CM

## 2023-01-01 DIAGNOSIS — Z29.89 IMMUNOTHERAPY ENCOUNTER: ICD-10-CM

## 2023-01-01 DIAGNOSIS — Z79.899 ON ANTINEOPLASTIC CHEMOTHERAPY: ICD-10-CM

## 2023-01-01 DIAGNOSIS — E07.9 THYROID CONDITION: ICD-10-CM

## 2023-01-01 DIAGNOSIS — Z29.89 IMMUNOTHERAPY ENCOUNTER: Primary | ICD-10-CM

## 2023-01-01 DIAGNOSIS — Z13.29 SCREENING FOR THYROID DISORDER: ICD-10-CM

## 2023-01-01 DIAGNOSIS — Z71.9 ENCOUNTER FOR EDUCATION: ICD-10-CM

## 2023-01-01 DIAGNOSIS — R91.1 LUNG NODULE: ICD-10-CM

## 2023-01-01 LAB
ALBUMIN SERPL-MCNC: 3.1 G/DL (ref 3.4–4.8)
ALBUMIN SERPL-MCNC: 3.5 G/DL (ref 3.4–4.8)
ALBUMIN SERPL-MCNC: 3.6 G/DL (ref 3.4–4.8)
ALBUMIN SERPL-MCNC: 3.6 G/DL (ref 3.4–4.8)
ALBUMIN SERPL-MCNC: 3.7 G/DL (ref 3.4–4.8)
ALBUMIN SERPL-MCNC: 3.8 G/DL (ref 3.4–4.8)
ALBUMIN SERPL-MCNC: 3.9 G/DL (ref 3.4–4.8)
ALBUMIN/GLOB SERPL: 0.8 RATIO (ref 1.1–2)
ALBUMIN/GLOB SERPL: 0.8 RATIO (ref 1.1–2)
ALBUMIN/GLOB SERPL: 0.9 RATIO (ref 1.1–2)
ALBUMIN/GLOB SERPL: 0.9 RATIO (ref 1.1–2)
ALBUMIN/GLOB SERPL: 1 RATIO (ref 1.1–2)
ALBUMIN/GLOB SERPL: 1 RATIO (ref 1.1–2)
ALBUMIN/GLOB SERPL: 1.1 RATIO (ref 1.1–2)
ALBUMIN/GLOB SERPL: 1.1 RATIO (ref 1.1–2)
ALBUMIN/GLOB SERPL: 1.2 RATIO (ref 1.1–2)
ALP SERPL-CCNC: 100 UNIT/L (ref 40–150)
ALP SERPL-CCNC: 104 UNIT/L (ref 40–150)
ALP SERPL-CCNC: 76 UNIT/L (ref 40–150)
ALP SERPL-CCNC: 81 UNIT/L (ref 40–150)
ALP SERPL-CCNC: 81 UNIT/L (ref 40–150)
ALP SERPL-CCNC: 86 UNIT/L (ref 40–150)
ALP SERPL-CCNC: 88 UNIT/L (ref 40–150)
ALP SERPL-CCNC: 90 UNIT/L (ref 40–150)
ALP SERPL-CCNC: 95 UNIT/L (ref 40–150)
ALT SERPL-CCNC: 10 UNIT/L (ref 0–55)
ALT SERPL-CCNC: 10 UNIT/L (ref 0–55)
ALT SERPL-CCNC: 14 UNIT/L (ref 0–55)
ALT SERPL-CCNC: 7 UNIT/L (ref 0–55)
ALT SERPL-CCNC: 7 UNIT/L (ref 0–55)
ALT SERPL-CCNC: 8 UNIT/L (ref 0–55)
ALT SERPL-CCNC: 8 UNIT/L (ref 0–55)
ALT SERPL-CCNC: 9 UNIT/L (ref 0–55)
ALT SERPL-CCNC: 9 UNIT/L (ref 0–55)
AST SERPL-CCNC: 10 UNIT/L (ref 5–34)
AST SERPL-CCNC: 12 UNIT/L (ref 5–34)
AST SERPL-CCNC: 13 UNIT/L (ref 5–34)
AST SERPL-CCNC: 14 UNIT/L (ref 5–34)
AST SERPL-CCNC: 14 UNIT/L (ref 5–34)
AST SERPL-CCNC: 15 UNIT/L (ref 5–34)
AST SERPL-CCNC: 18 UNIT/L (ref 5–34)
AST SERPL-CCNC: 18 UNIT/L (ref 5–34)
AST SERPL-CCNC: 20 UNIT/L (ref 5–34)
BASOPHILS # BLD AUTO: 0.06 X10(3)/MCL (ref 0–0.2)
BASOPHILS # BLD AUTO: 0.07 X10(3)/MCL
BASOPHILS # BLD AUTO: 0.07 X10(3)/MCL (ref 0–0.2)
BASOPHILS # BLD AUTO: 0.08 X10(3)/MCL
BASOPHILS # BLD AUTO: 0.08 X10(3)/MCL
BASOPHILS # BLD AUTO: 0.08 X10(3)/MCL (ref 0–0.2)
BASOPHILS # BLD AUTO: 0.08 X10(3)/MCL (ref 0–0.2)
BASOPHILS # BLD AUTO: 0.1 X10(3)/MCL
BASOPHILS # BLD AUTO: 0.12 X10(3)/MCL (ref 0–0.2)
BASOPHILS NFR BLD AUTO: 0.7 %
BASOPHILS NFR BLD AUTO: 0.7 %
BASOPHILS NFR BLD AUTO: 0.8 %
BASOPHILS NFR BLD AUTO: 0.8 %
BASOPHILS NFR BLD AUTO: 1 %
BASOPHILS NFR BLD AUTO: 1.1 %
BASOPHILS NFR BLD AUTO: 1.3 %
BILIRUBIN DIRECT+TOT PNL SERPL-MCNC: 0.4 MG/DL
BILIRUBIN DIRECT+TOT PNL SERPL-MCNC: 0.5 MG/DL
BILIRUBIN DIRECT+TOT PNL SERPL-MCNC: 0.6 MG/DL
BILIRUBIN DIRECT+TOT PNL SERPL-MCNC: 0.8 MG/DL
BUN SERPL-MCNC: 10 MG/DL (ref 8.4–25.7)
BUN SERPL-MCNC: 10 MG/DL (ref 8.4–25.7)
BUN SERPL-MCNC: 12 MG/DL (ref 8.4–25.7)
BUN SERPL-MCNC: 14 MG/DL (ref 8.4–25.7)
BUN SERPL-MCNC: 14 MG/DL (ref 8.4–25.7)
BUN SERPL-MCNC: 16 MG/DL (ref 8.4–25.7)
BUN SERPL-MCNC: 18 MG/DL (ref 8.4–25.7)
CALCIUM SERPL-MCNC: 8.7 MG/DL (ref 8.8–10)
CALCIUM SERPL-MCNC: 8.7 MG/DL (ref 8.8–10)
CALCIUM SERPL-MCNC: 8.8 MG/DL (ref 8.8–10)
CALCIUM SERPL-MCNC: 9 MG/DL (ref 8.8–10)
CALCIUM SERPL-MCNC: 9.1 MG/DL (ref 8.8–10)
CALCIUM SERPL-MCNC: 9.2 MG/DL (ref 8.8–10)
CALCIUM SERPL-MCNC: 9.4 MG/DL (ref 8.8–10)
CALCIUM SERPL-MCNC: 9.6 MG/DL (ref 8.8–10)
CALCIUM SERPL-MCNC: 9.9 MG/DL (ref 8.8–10)
CEA SERPL-MCNC: 4.8 NG/ML (ref 0–3)
CHLORIDE SERPL-SCNC: 100 MMOL/L (ref 98–107)
CHLORIDE SERPL-SCNC: 100 MMOL/L (ref 98–107)
CHLORIDE SERPL-SCNC: 102 MMOL/L (ref 98–107)
CHLORIDE SERPL-SCNC: 102 MMOL/L (ref 98–107)
CHLORIDE SERPL-SCNC: 103 MMOL/L (ref 98–107)
CHLORIDE SERPL-SCNC: 103 MMOL/L (ref 98–107)
CHLORIDE SERPL-SCNC: 97 MMOL/L (ref 98–107)
CHLORIDE SERPL-SCNC: 98 MMOL/L (ref 98–107)
CHLORIDE SERPL-SCNC: 99 MMOL/L (ref 98–107)
CO2 SERPL-SCNC: 26 MMOL/L (ref 23–31)
CO2 SERPL-SCNC: 26 MMOL/L (ref 23–31)
CO2 SERPL-SCNC: 27 MMOL/L (ref 23–31)
CO2 SERPL-SCNC: 27 MMOL/L (ref 23–31)
CO2 SERPL-SCNC: 28 MMOL/L (ref 23–31)
CO2 SERPL-SCNC: 28 MMOL/L (ref 23–31)
CO2 SERPL-SCNC: 29 MMOL/L (ref 23–31)
CO2 SERPL-SCNC: 29 MMOL/L (ref 23–31)
CO2 SERPL-SCNC: 32 MMOL/L (ref 23–31)
CREAT SERPL-MCNC: 1.16 MG/DL (ref 0.73–1.18)
CREAT SERPL-MCNC: 1.2 MG/DL (ref 0.73–1.18)
CREAT SERPL-MCNC: 1.2 MG/DL (ref 0.73–1.18)
CREAT SERPL-MCNC: 1.23 MG/DL (ref 0.73–1.18)
CREAT SERPL-MCNC: 1.23 MG/DL (ref 0.73–1.18)
CREAT SERPL-MCNC: 1.28 MG/DL (ref 0.73–1.18)
CREAT SERPL-MCNC: 1.28 MG/DL (ref 0.73–1.18)
CREAT SERPL-MCNC: 1.37 MG/DL (ref 0.73–1.18)
CREAT SERPL-MCNC: 1.41 MG/DL (ref 0.73–1.18)
DEPRECATED CALCIDIOL+CALCIFEROL SERPL-MC: 8 NG/ML (ref 30–80)
EOSINOPHIL # BLD AUTO: 0.05 X10(3)/MCL (ref 0–0.9)
EOSINOPHIL # BLD AUTO: 0.06 X10(3)/MCL (ref 0–0.9)
EOSINOPHIL # BLD AUTO: 0.1 X10(3)/MCL (ref 0–0.9)
EOSINOPHIL # BLD AUTO: 0.1 X10(3)/MCL (ref 0–0.9)
EOSINOPHIL # BLD AUTO: 0.11 X10(3)/MCL (ref 0–0.9)
EOSINOPHIL # BLD AUTO: 0.13 X10(3)/MCL (ref 0–0.9)
EOSINOPHIL # BLD AUTO: 0.13 X10(3)/MCL (ref 0–0.9)
EOSINOPHIL # BLD AUTO: 0.14 X10(3)/MCL (ref 0–0.9)
EOSINOPHIL # BLD AUTO: 0.21 X10(3)/MCL (ref 0–0.9)
EOSINOPHIL NFR BLD AUTO: 0.5 %
EOSINOPHIL NFR BLD AUTO: 0.8 %
EOSINOPHIL NFR BLD AUTO: 1 %
EOSINOPHIL NFR BLD AUTO: 1.2 %
EOSINOPHIL NFR BLD AUTO: 1.3 %
EOSINOPHIL NFR BLD AUTO: 1.4 %
EOSINOPHIL NFR BLD AUTO: 1.7 %
EOSINOPHIL NFR BLD AUTO: 1.9 %
EOSINOPHIL NFR BLD AUTO: 2.1 %
ERYTHROCYTE [DISTWIDTH] IN BLOOD BY AUTOMATED COUNT: 13.9 % (ref 11.5–17)
ERYTHROCYTE [DISTWIDTH] IN BLOOD BY AUTOMATED COUNT: 14.1 % (ref 11.5–17)
ERYTHROCYTE [DISTWIDTH] IN BLOOD BY AUTOMATED COUNT: 14.2 % (ref 11.5–17)
ERYTHROCYTE [DISTWIDTH] IN BLOOD BY AUTOMATED COUNT: 14.9 % (ref 11.5–17)
ERYTHROCYTE [DISTWIDTH] IN BLOOD BY AUTOMATED COUNT: 15.3 % (ref 11.5–17)
ERYTHROCYTE [DISTWIDTH] IN BLOOD BY AUTOMATED COUNT: 15.8 % (ref 11.5–17)
ERYTHROCYTE [DISTWIDTH] IN BLOOD BY AUTOMATED COUNT: 16.7 % (ref 11.5–17)
ERYTHROCYTE [DISTWIDTH] IN BLOOD BY AUTOMATED COUNT: 17.3 % (ref 11.5–17)
ERYTHROCYTE [DISTWIDTH] IN BLOOD BY AUTOMATED COUNT: 18.5 % (ref 11.5–17)
FERRITIN SERPL-MCNC: 178.28 NG/ML (ref 21.81–274.66)
FOLATE SERPL-MCNC: 36.6 NG/ML (ref 7–31.4)
GFR SERPLBLD CREATININE-BSD FMLA CKD-EPI: 56 MLS/MIN/1.73/M2
GFR SERPLBLD CREATININE-BSD FMLA CKD-EPI: 58 MLS/MIN/1.73/M2
GFR SERPLBLD CREATININE-BSD FMLA CKD-EPI: >60 MLS/MIN/1.73/M2
GLOBULIN SER-MCNC: 3.2 GM/DL (ref 2.4–3.5)
GLOBULIN SER-MCNC: 3.4 GM/DL (ref 2.4–3.5)
GLOBULIN SER-MCNC: 3.6 GM/DL (ref 2.4–3.5)
GLOBULIN SER-MCNC: 3.8 GM/DL (ref 2.4–3.5)
GLOBULIN SER-MCNC: 3.9 GM/DL (ref 2.4–3.5)
GLOBULIN SER-MCNC: 4.3 GM/DL (ref 2.4–3.5)
GLUCOSE SERPL-MCNC: 175 MG/DL (ref 82–115)
GLUCOSE SERPL-MCNC: 254 MG/DL (ref 82–115)
GLUCOSE SERPL-MCNC: 256 MG/DL (ref 82–115)
GLUCOSE SERPL-MCNC: 274 MG/DL (ref 82–115)
GLUCOSE SERPL-MCNC: 315 MG/DL (ref 82–115)
GLUCOSE SERPL-MCNC: 346 MG/DL (ref 82–115)
GLUCOSE SERPL-MCNC: 358 MG/DL (ref 82–115)
GLUCOSE SERPL-MCNC: 399 MG/DL (ref 82–115)
GLUCOSE SERPL-MCNC: 61 MG/DL (ref 82–115)
HCT VFR BLD AUTO: 35.4 % (ref 42–52)
HCT VFR BLD AUTO: 36.4 % (ref 42–52)
HCT VFR BLD AUTO: 37.9 % (ref 42–52)
HCT VFR BLD AUTO: 41.2 % (ref 42–52)
HCT VFR BLD AUTO: 41.8 % (ref 42–52)
HCT VFR BLD AUTO: 42.2 % (ref 42–52)
HCT VFR BLD AUTO: 42.5 % (ref 42–52)
HCT VFR BLD AUTO: 44.6 % (ref 42–52)
HCT VFR BLD AUTO: 45.6 % (ref 42–52)
HGB BLD-MCNC: 10.9 GM/DL (ref 14–18)
HGB BLD-MCNC: 11.2 GM/DL (ref 14–18)
HGB BLD-MCNC: 11.6 G/DL (ref 14–18)
HGB BLD-MCNC: 12.6 G/DL (ref 14–18)
HGB BLD-MCNC: 12.8 GM/DL (ref 14–18)
HGB BLD-MCNC: 13.1 G/DL (ref 14–18)
HGB BLD-MCNC: 13.3 G/DL (ref 14–18)
HGB BLD-MCNC: 13.8 G/DL (ref 14–18)
HGB BLD-MCNC: 14 G/DL (ref 14–18)
IMM GRANULOCYTES # BLD AUTO: 0.01 X10(3)/MCL (ref 0–0.04)
IMM GRANULOCYTES # BLD AUTO: 0.02 X10(3)/MCL (ref 0–0.04)
IMM GRANULOCYTES # BLD AUTO: 0.02 X10(3)/MCL (ref 0–0.04)
IMM GRANULOCYTES # BLD AUTO: 0.03 X10(3)/MCL (ref 0–0.04)
IMM GRANULOCYTES # BLD AUTO: 0.03 X10(3)/MCL (ref 0–0.04)
IMM GRANULOCYTES # BLD AUTO: 0.04 X10(3)/MCL (ref 0–0.04)
IMM GRANULOCYTES # BLD AUTO: 0.05 X10(3)/MCL (ref 0–0.04)
IMM GRANULOCYTES NFR BLD AUTO: 0.2 %
IMM GRANULOCYTES NFR BLD AUTO: 0.3 %
IMM GRANULOCYTES NFR BLD AUTO: 0.4 %
IMM GRANULOCYTES NFR BLD AUTO: 0.5 %
IRON SATN MFR SERPL: 18 % (ref 20–50)
IRON SERPL-MCNC: 47 UG/DL (ref 65–175)
LYMPHOCYTES # BLD AUTO: 0.96 X10(3)/MCL (ref 0.6–4.6)
LYMPHOCYTES # BLD AUTO: 1.29 X10(3)/MCL (ref 0.6–4.6)
LYMPHOCYTES # BLD AUTO: 1.32 X10(3)/MCL (ref 0.6–4.6)
LYMPHOCYTES # BLD AUTO: 1.46 X10(3)/MCL (ref 0.6–4.6)
LYMPHOCYTES # BLD AUTO: 1.54 X10(3)/MCL (ref 0.6–4.6)
LYMPHOCYTES # BLD AUTO: 1.55 X10(3)/MCL (ref 0.6–4.6)
LYMPHOCYTES # BLD AUTO: 1.75 X10(3)/MCL (ref 0.6–4.6)
LYMPHOCYTES # BLD AUTO: 1.81 X10(3)/MCL (ref 0.6–4.6)
LYMPHOCYTES # BLD AUTO: 1.96 X10(3)/MCL (ref 0.6–4.6)
LYMPHOCYTES NFR BLD AUTO: 13.7 %
LYMPHOCYTES NFR BLD AUTO: 14.6 %
LYMPHOCYTES NFR BLD AUTO: 15.9 %
LYMPHOCYTES NFR BLD AUTO: 16 %
LYMPHOCYTES NFR BLD AUTO: 17.2 %
LYMPHOCYTES NFR BLD AUTO: 18.1 %
LYMPHOCYTES NFR BLD AUTO: 20 %
LYMPHOCYTES NFR BLD AUTO: 20.6 %
LYMPHOCYTES NFR BLD AUTO: 21 %
MAGNESIUM SERPL-MCNC: 1.6 MG/DL (ref 1.6–2.6)
MAGNESIUM SERPL-MCNC: 1.7 MG/DL (ref 1.6–2.6)
MAGNESIUM SERPL-MCNC: 1.8 MG/DL (ref 1.6–2.6)
MCH RBC QN AUTO: 26.4 PG (ref 27–31)
MCH RBC QN AUTO: 26.5 PG (ref 27–31)
MCH RBC QN AUTO: 26.7 PG (ref 27–31)
MCH RBC QN AUTO: 26.8 PG (ref 27–31)
MCH RBC QN AUTO: 27 PG (ref 27–31)
MCH RBC QN AUTO: 29 PG
MCH RBC QN AUTO: 30 PG
MCH RBC QN AUTO: 30.7 PG
MCH RBC QN AUTO: 30.8 PG
MCHC RBC AUTO-ENTMCNC: 30.1 G/DL (ref 33–36)
MCHC RBC AUTO-ENTMCNC: 30.3 G/DL (ref 33–36)
MCHC RBC AUTO-ENTMCNC: 30.6 G/DL (ref 33–36)
MCHC RBC AUTO-ENTMCNC: 30.8 MG/DL (ref 33–36)
MCHC RBC AUTO-ENTMCNC: 30.8 MG/DL (ref 33–36)
MCHC RBC AUTO-ENTMCNC: 31 G/DL (ref 33–36)
MCHC RBC AUTO-ENTMCNC: 31.1 MG/DL (ref 33–36)
MCHC RBC AUTO-ENTMCNC: 31.3 G/DL (ref 33–36)
MCHC RBC AUTO-ENTMCNC: 31.4 G/DL (ref 33–36)
MCV RBC AUTO: 100 FL (ref 80–94)
MCV RBC AUTO: 84.7 FL (ref 80–94)
MCV RBC AUTO: 84.9 FL (ref 80–94)
MCV RBC AUTO: 85.4 FL (ref 80–94)
MCV RBC AUTO: 87.3 FL (ref 80–94)
MCV RBC AUTO: 89.1 FL (ref 80–94)
MCV RBC AUTO: 96.3 FL (ref 80–94)
MCV RBC AUTO: 96.5 FL (ref 80–94)
MCV RBC AUTO: 99.7 FL (ref 80–94)
MONOCYTES # BLD AUTO: 0.48 X10(3)/MCL (ref 0.1–1.3)
MONOCYTES # BLD AUTO: 0.53 X10(3)/MCL (ref 0.1–1.3)
MONOCYTES # BLD AUTO: 0.55 X10(3)/MCL (ref 0.1–1.3)
MONOCYTES # BLD AUTO: 0.58 X10(3)/MCL (ref 0.1–1.3)
MONOCYTES # BLD AUTO: 0.72 X10(3)/MCL (ref 0.1–1.3)
MONOCYTES # BLD AUTO: 0.76 X10(3)/MCL (ref 0.1–1.3)
MONOCYTES # BLD AUTO: 0.8 X10(3)/MCL (ref 0.1–1.3)
MONOCYTES # BLD AUTO: 0.89 X10(3)/MCL (ref 0.1–1.3)
MONOCYTES # BLD AUTO: 0.99 X10(3)/MCL (ref 0.1–1.3)
MONOCYTES NFR BLD AUTO: 12.2 %
MONOCYTES NFR BLD AUTO: 5.7 %
MONOCYTES NFR BLD AUTO: 6.1 %
MONOCYTES NFR BLD AUTO: 6.4 %
MONOCYTES NFR BLD AUTO: 7.9 %
MONOCYTES NFR BLD AUTO: 8 %
MONOCYTES NFR BLD AUTO: 8.3 %
MONOCYTES NFR BLD AUTO: 9.2 %
MONOCYTES NFR BLD AUTO: 9.4 %
NEUTROPHILS # BLD AUTO: 4.07 X10(3)/MCL (ref 2.1–9.2)
NEUTROPHILS # BLD AUTO: 4.34 X10(3)/MCL (ref 2.1–9.2)
NEUTROPHILS # BLD AUTO: 4.4 X10(3)/MCL (ref 2.1–9.2)
NEUTROPHILS # BLD AUTO: 4.74 X10(3)/MCL (ref 2.1–9.2)
NEUTROPHILS # BLD AUTO: 7.39 X10(3)/MCL (ref 2.1–9.2)
NEUTROPHILS # BLD AUTO: 7.4 X10(3)/MCL (ref 2.1–9.2)
NEUTROPHILS # BLD AUTO: 7.49 X10(3)/MCL (ref 2.1–9.2)
NEUTROPHILS # BLD AUTO: 8.75 X10(3)/MCL (ref 2.1–9.2)
NEUTROPHILS # BLD AUTO: 9.58 X10(3)/MCL (ref 2.1–9.2)
NEUTROPHILS NFR BLD AUTO: 65 %
NEUTROPHILS NFR BLD AUTO: 66.3 %
NEUTROPHILS NFR BLD AUTO: 67.8 %
NEUTROPHILS NFR BLD AUTO: 69.3 %
NEUTROPHILS NFR BLD AUTO: 72.5 %
NEUTROPHILS NFR BLD AUTO: 73.6 %
NEUTROPHILS NFR BLD AUTO: 76.3 %
NEUTROPHILS NFR BLD AUTO: 77.6 %
NEUTROPHILS NFR BLD AUTO: 77.6 %
PHOSPHATE SERPL-MCNC: 2.9 MG/DL (ref 2.3–4.7)
PHOSPHATE SERPL-MCNC: 3 MG/DL (ref 2.3–4.7)
PHOSPHATE SERPL-MCNC: 3 MG/DL (ref 2.3–4.7)
PHOSPHATE SERPL-MCNC: 3.3 MG/DL (ref 2.3–4.7)
PHOSPHATE SERPL-MCNC: 3.5 MG/DL (ref 2.3–4.7)
PHOSPHATE SERPL-MCNC: 3.5 MG/DL (ref 2.3–4.7)
PHOSPHATE SERPL-MCNC: 3.8 MG/DL (ref 2.3–4.7)
PHOSPHATE SERPL-MCNC: 3.9 MG/DL (ref 2.3–4.7)
PHOSPHATE SERPL-MCNC: 4.5 MG/DL (ref 2.3–4.7)
PLATELET # BLD AUTO: 230 X10(3)/MCL (ref 130–400)
PLATELET # BLD AUTO: 248 X10(3)/MCL (ref 130–400)
PLATELET # BLD AUTO: 253 X10(3)/MCL (ref 130–400)
PLATELET # BLD AUTO: 259 X10(3)/MCL (ref 130–400)
PLATELET # BLD AUTO: 265 X10(3)/MCL (ref 130–400)
PLATELET # BLD AUTO: 279 X10(3)/MCL (ref 130–400)
PLATELET # BLD AUTO: 281 X10(3)/MCL (ref 130–400)
PLATELET # BLD AUTO: 282 X10(3)/MCL (ref 130–400)
PLATELET # BLD AUTO: 290 X10(3)/MCL (ref 130–400)
PMV BLD AUTO: 10.3 FL (ref 7.4–10.4)
PMV BLD AUTO: 10.4 FL (ref 7.4–10.4)
PMV BLD AUTO: 10.6 FL (ref 7.4–10.4)
PMV BLD AUTO: 10.9 FL (ref 7.4–10.4)
PMV BLD AUTO: 11 FL (ref 7.4–10.4)
PMV BLD AUTO: 11.1 FL (ref 7.4–10.4)
PMV BLD AUTO: 11.3 FL (ref 7.4–10.4)
POTASSIUM SERPL-SCNC: 4.3 MMOL/L (ref 3.5–5.1)
POTASSIUM SERPL-SCNC: 4.4 MMOL/L (ref 3.5–5.1)
POTASSIUM SERPL-SCNC: 4.5 MMOL/L (ref 3.5–5.1)
POTASSIUM SERPL-SCNC: 4.7 MMOL/L (ref 3.5–5.1)
POTASSIUM SERPL-SCNC: 4.8 MMOL/L (ref 3.5–5.1)
POTASSIUM SERPL-SCNC: 4.8 MMOL/L (ref 3.5–5.1)
POTASSIUM SERPL-SCNC: 5.4 MMOL/L (ref 3.5–5.1)
PROT SERPL-MCNC: 7 GM/DL (ref 5.8–7.6)
PROT SERPL-MCNC: 7.1 GM/DL (ref 5.8–7.6)
PROT SERPL-MCNC: 7.1 GM/DL (ref 5.8–7.6)
PROT SERPL-MCNC: 7.3 GM/DL (ref 5.8–7.6)
PROT SERPL-MCNC: 7.4 GM/DL (ref 5.8–7.6)
PROT SERPL-MCNC: 7.4 GM/DL (ref 5.8–7.6)
PROT SERPL-MCNC: 7.5 GM/DL (ref 5.8–7.6)
PROT SERPL-MCNC: 7.5 GM/DL (ref 5.8–7.6)
PROT SERPL-MCNC: 7.9 GM/DL (ref 5.8–7.6)
RBC # BLD AUTO: 3.54 X10(6)/MCL (ref 4.7–6.1)
RBC # BLD AUTO: 3.65 X10(6)/MCL (ref 4.7–6.1)
RBC # BLD AUTO: 4.27 X10(6)/MCL (ref 4.7–6.1)
RBC # BLD AUTO: 4.34 X10(6)/MCL (ref 4.7–6.1)
RBC # BLD AUTO: 4.34 X10(6)/MCL (ref 4.7–6.1)
RBC # BLD AUTO: 4.97 X10(6)/MCL (ref 4.7–6.1)
RBC # BLD AUTO: 5.02 X10(6)/MCL (ref 4.7–6.1)
RBC # BLD AUTO: 5.12 X10(6)/MCL (ref 4.7–6.1)
RBC # BLD AUTO: 5.22 X10(6)/MCL (ref 4.7–6.1)
SODIUM SERPL-SCNC: 131 MMOL/L (ref 136–145)
SODIUM SERPL-SCNC: 136 MMOL/L (ref 136–145)
SODIUM SERPL-SCNC: 138 MMOL/L (ref 136–145)
SODIUM SERPL-SCNC: 138 MMOL/L (ref 136–145)
SODIUM SERPL-SCNC: 139 MMOL/L (ref 136–145)
SODIUM SERPL-SCNC: 139 MMOL/L (ref 136–145)
SODIUM SERPL-SCNC: 140 MMOL/L (ref 136–145)
TIBC SERPL-MCNC: 213 UG/DL (ref 69–240)
TIBC SERPL-MCNC: 260 UG/DL (ref 250–450)
TSH SERPL-ACNC: 0.53 UIU/ML (ref 0.35–4.94)
TSH SERPL-ACNC: 0.63 UIU/ML (ref 0.35–4.94)
TSH SERPL-ACNC: 0.65 UIU/ML (ref 0.35–4.94)
VIT B12 SERPL-MCNC: 593 PG/ML (ref 213–816)
WBC # SPEC AUTO: 10.19 X10(3)/MCL (ref 4.5–11.5)
WBC # SPEC AUTO: 10.8 X10(3)/MCL (ref 4.5–11.5)
WBC # SPEC AUTO: 11.28 X10(3)/MCL (ref 4.5–11.5)
WBC # SPEC AUTO: 12.36 X10(3)/MCL (ref 4.5–11.5)
WBC # SPEC AUTO: 6 X10(3)/MCL (ref 4.5–11.5)
WBC # SPEC AUTO: 6.1 X10(3)/MCL (ref 4.5–11.5)
WBC # SPEC AUTO: 6.4 X10(3)/MCL (ref 4.5–11.5)
WBC # SPEC AUTO: 7.3 X10(3)/MCL (ref 4.5–11.5)
WBC # SPEC AUTO: 9.69 X10(3)/MCL (ref 4.5–11.5)

## 2023-01-01 PROCEDURE — 84100 ASSAY OF PHOSPHORUS: CPT

## 2023-01-01 PROCEDURE — 99999 PR PBB SHADOW E&M-EST. PATIENT-LVL V: CPT | Mod: PBBFAC,,, | Performed by: INTERNAL MEDICINE

## 2023-01-01 PROCEDURE — 96413 CHEMO IV INFUSION 1 HR: CPT

## 2023-01-01 PROCEDURE — 82746 ASSAY OF FOLIC ACID SERUM: CPT

## 2023-01-01 PROCEDURE — 99215 PR OFFICE/OUTPT VISIT, EST, LEVL V, 40-54 MIN: ICD-10-PCS | Mod: S$PBB,,, | Performed by: INTERNAL MEDICINE

## 2023-01-01 PROCEDURE — 99215 OFFICE O/P EST HI 40 MIN: CPT | Mod: S$PBB,,, | Performed by: INTERNAL MEDICINE

## 2023-01-01 PROCEDURE — 85025 COMPLETE CBC W/AUTO DIFF WBC: CPT

## 2023-01-01 PROCEDURE — 99999 PR PBB SHADOW E&M-EST. PATIENT-LVL V: CPT | Mod: PBBFAC,,, | Performed by: NURSE PRACTITIONER

## 2023-01-01 PROCEDURE — 96366 THER/PROPH/DIAG IV INF ADDON: CPT

## 2023-01-01 PROCEDURE — 99999 PR PBB SHADOW E&M-EST. PATIENT-LVL V: ICD-10-PCS | Mod: PBBFAC,,, | Performed by: INTERNAL MEDICINE

## 2023-01-01 PROCEDURE — 99215 PR OFFICE/OUTPT VISIT, EST, LEVL V, 40-54 MIN: ICD-10-PCS | Mod: S$PBB,,,

## 2023-01-01 PROCEDURE — 36415 COLL VENOUS BLD VENIPUNCTURE: CPT

## 2023-01-01 PROCEDURE — 25000003 PHARM REV CODE 250

## 2023-01-01 PROCEDURE — 84443 ASSAY THYROID STIM HORMONE: CPT

## 2023-01-01 PROCEDURE — 25000003 PHARM REV CODE 250: Performed by: INTERNAL MEDICINE

## 2023-01-01 PROCEDURE — 83735 ASSAY OF MAGNESIUM: CPT

## 2023-01-01 PROCEDURE — 80053 COMPREHEN METABOLIC PANEL: CPT

## 2023-01-01 PROCEDURE — 99999 PR PBB SHADOW E&M-EST. PATIENT-LVL V: CPT | Mod: PBBFAC,,,

## 2023-01-01 PROCEDURE — 99999 PR PBB SHADOW E&M-EST. PATIENT-LVL III: CPT | Mod: PBBFAC,,,

## 2023-01-01 PROCEDURE — 71046 X-RAY EXAM CHEST 2 VIEWS: CPT | Mod: TC

## 2023-01-01 PROCEDURE — 96365 THER/PROPH/DIAG IV INF INIT: CPT

## 2023-01-01 PROCEDURE — 99214 OFFICE O/P EST MOD 30 MIN: CPT | Mod: S$PBB,,, | Performed by: NURSE PRACTITIONER

## 2023-01-01 PROCEDURE — 71250 CT THORAX DX C-: CPT | Mod: TC

## 2023-01-01 PROCEDURE — 82306 VITAMIN D 25 HYDROXY: CPT

## 2023-01-01 PROCEDURE — 99214 OFFICE O/P EST MOD 30 MIN: CPT | Mod: S$PBB,,,

## 2023-01-01 PROCEDURE — 99213 OFFICE O/P EST LOW 20 MIN: CPT | Mod: PBBFAC,25

## 2023-01-01 PROCEDURE — 99999 PR PBB SHADOW E&M-EST. PATIENT-LVL III: ICD-10-PCS | Mod: PBBFAC,,,

## 2023-01-01 PROCEDURE — 99215 OFFICE O/P EST HI 40 MIN: CPT | Mod: PBBFAC | Performed by: INTERNAL MEDICINE

## 2023-01-01 PROCEDURE — 96361 HYDRATE IV INFUSION ADD-ON: CPT

## 2023-01-01 PROCEDURE — 99215 OFFICE O/P EST HI 40 MIN: CPT | Mod: PBBFAC,25 | Performed by: NURSE PRACTITIONER

## 2023-01-01 PROCEDURE — 99999 PR PBB SHADOW E&M-EST. PATIENT-LVL V: ICD-10-PCS | Mod: PBBFAC,,,

## 2023-01-01 PROCEDURE — 25000003 PHARM REV CODE 250: Performed by: NURSE PRACTITIONER

## 2023-01-01 PROCEDURE — 99214 PR OFFICE/OUTPT VISIT, EST, LEVL IV, 30-39 MIN: ICD-10-PCS | Mod: S$PBB,,, | Performed by: NURSE PRACTITIONER

## 2023-01-01 PROCEDURE — 82378 CARCINOEMBRYONIC ANTIGEN: CPT

## 2023-01-01 PROCEDURE — 71260 CT THORAX DX C+: CPT | Mod: TC

## 2023-01-01 PROCEDURE — 99215 OFFICE O/P EST HI 40 MIN: CPT | Mod: S$PBB,,,

## 2023-01-01 PROCEDURE — 25500020 PHARM REV CODE 255: Performed by: NURSE PRACTITIONER

## 2023-01-01 PROCEDURE — 82607 VITAMIN B-12: CPT

## 2023-01-01 PROCEDURE — 82728 ASSAY OF FERRITIN: CPT

## 2023-01-01 PROCEDURE — 99215 OFFICE O/P EST HI 40 MIN: CPT | Mod: PBBFAC,25

## 2023-01-01 PROCEDURE — 74176 CT ABD & PELVIS W/O CONTRAST: CPT | Mod: TC

## 2023-01-01 PROCEDURE — 83550 IRON BINDING TEST: CPT

## 2023-01-01 PROCEDURE — 63600175 PHARM REV CODE 636 W HCPCS

## 2023-01-01 PROCEDURE — 63600175 PHARM REV CODE 636 W HCPCS: Mod: JZ,JG | Performed by: INTERNAL MEDICINE

## 2023-01-01 PROCEDURE — 99999 PR PBB SHADOW E&M-EST. PATIENT-LVL V: ICD-10-PCS | Mod: PBBFAC,,, | Performed by: NURSE PRACTITIONER

## 2023-01-01 PROCEDURE — 63600175 PHARM REV CODE 636 W HCPCS: Mod: JZ,JG

## 2023-01-01 PROCEDURE — 99214 PR OFFICE/OUTPT VISIT, EST, LEVL IV, 30-39 MIN: ICD-10-PCS | Mod: S$PBB,,,

## 2023-01-01 PROCEDURE — 99215 OFFICE O/P EST HI 40 MIN: CPT | Mod: PBBFAC,25 | Performed by: INTERNAL MEDICINE

## 2023-01-01 PROCEDURE — 96360 HYDRATION IV INFUSION INIT: CPT

## 2023-01-01 RX ORDER — DOXYCYCLINE 100 MG/1
100 CAPSULE ORAL 2 TIMES DAILY
COMMUNITY
Start: 2023-01-01

## 2023-01-01 RX ORDER — SODIUM CHLORIDE 0.9 % (FLUSH) 0.9 %
10 SYRINGE (ML) INJECTION
Status: CANCELLED | OUTPATIENT
Start: 2023-01-01

## 2023-01-01 RX ORDER — EPINEPHRINE 0.3 MG/.3ML
0.3 INJECTION SUBCUTANEOUS ONCE AS NEEDED
Status: ACTIVE | OUTPATIENT
Start: 2023-01-01 | End: 2034-09-22

## 2023-01-01 RX ORDER — HEPARIN 100 UNIT/ML
500 SYRINGE INTRAVENOUS
Status: CANCELLED | OUTPATIENT
Start: 2023-01-01

## 2023-01-01 RX ORDER — MEGESTROL ACETATE 40 MG/ML
400 SUSPENSION ORAL DAILY
Qty: 240 ML | Refills: 1 | Status: SHIPPED | OUTPATIENT
Start: 2023-01-01 | End: 2024-06-20

## 2023-01-01 RX ORDER — SODIUM CHLORIDE 0.9 % (FLUSH) 0.9 %
10 SYRINGE (ML) INJECTION
Status: DISCONTINUED | OUTPATIENT
Start: 2023-01-01 | End: 2023-01-01

## 2023-01-01 RX ORDER — AMOXICILLIN AND CLAVULANATE POTASSIUM 875; 125 MG/1; MG/1
1 TABLET, FILM COATED ORAL EVERY 12 HOURS
Qty: 14 TABLET | Refills: 0 | Status: SHIPPED | OUTPATIENT
Start: 2023-01-01

## 2023-01-01 RX ORDER — DIPHENHYDRAMINE HYDROCHLORIDE 50 MG/ML
50 INJECTION INTRAMUSCULAR; INTRAVENOUS ONCE AS NEEDED
Status: CANCELLED | OUTPATIENT
Start: 2023-01-01

## 2023-01-01 RX ORDER — HEPARIN 100 UNIT/ML
500 SYRINGE INTRAVENOUS
Status: DISCONTINUED | OUTPATIENT
Start: 2023-01-01 | End: 2023-01-01

## 2023-01-01 RX ORDER — VIT C/E/ZN/COPPR/LUTEIN/ZEAXAN 250MG-90MG
1000 CAPSULE ORAL SEE ADMIN INSTRUCTIONS
Qty: 26 CAPSULE | Refills: 2 | Status: SHIPPED | OUTPATIENT
Start: 2023-01-01

## 2023-01-01 RX ORDER — EPINEPHRINE 0.3 MG/.3ML
0.3 INJECTION SUBCUTANEOUS ONCE AS NEEDED
Status: CANCELLED | OUTPATIENT
Start: 2023-01-01

## 2023-01-01 RX ORDER — HEPARIN 100 UNIT/ML
500 SYRINGE INTRAVENOUS
Status: ACTIVE | OUTPATIENT
Start: 2023-01-01

## 2023-01-01 RX ORDER — HYDROCORTISONE 25 MG/G
CREAM TOPICAL
COMMUNITY
Start: 2023-01-01

## 2023-01-01 RX ORDER — ASPIRIN 325 MG
50000 TABLET, DELAYED RELEASE (ENTERIC COATED) ORAL SEE ADMIN INSTRUCTIONS
Qty: 4 CAPSULE | Refills: 2 | Status: SHIPPED | OUTPATIENT
Start: 2023-01-01

## 2023-01-01 RX ORDER — SILVER SULFADIAZINE 10 G/1000G
CREAM TOPICAL
COMMUNITY
Start: 2023-01-01

## 2023-01-01 RX ORDER — ONDANSETRON 4 MG/1
8 TABLET, ORALLY DISINTEGRATING ORAL EVERY 8 HOURS PRN
Qty: 90 TABLET | Refills: 3 | Status: SHIPPED | OUTPATIENT
Start: 2023-01-01

## 2023-01-01 RX ORDER — SODIUM CHLORIDE 0.9 % (FLUSH) 0.9 %
10 SYRINGE (ML) INJECTION
Status: ACTIVE | OUTPATIENT
Start: 2023-01-01

## 2023-01-01 RX ORDER — TRIAMCINOLONE ACETONIDE 1 MG/G
1 CREAM TOPICAL DAILY PRN
COMMUNITY
Start: 2023-01-01

## 2023-01-01 RX ORDER — DIPHENHYDRAMINE HYDROCHLORIDE 50 MG/ML
50 INJECTION INTRAMUSCULAR; INTRAVENOUS ONCE AS NEEDED
Status: ACTIVE | OUTPATIENT
Start: 2023-01-01 | End: 2034-09-22

## 2023-01-01 RX ORDER — DULAGLUTIDE 1.5 MG/.5ML
1 INJECTION, SOLUTION SUBCUTANEOUS
COMMUNITY
Start: 2023-01-01

## 2023-01-01 RX ORDER — ONDANSETRON 8 MG/1
8 TABLET, ORALLY DISINTEGRATING ORAL EVERY 8 HOURS PRN
COMMUNITY
Start: 2023-01-01 | End: 2023-01-01

## 2023-01-01 RX ORDER — DOXYCYCLINE HYCLATE 100 MG
100 TABLET ORAL 2 TIMES DAILY
COMMUNITY
Start: 2023-01-01

## 2023-01-01 RX ORDER — PREDNISOLONE ACETATE 10 MG/ML
SUSPENSION/ DROPS OPHTHALMIC
COMMUNITY
Start: 2023-01-01

## 2023-01-01 RX ORDER — HEPARIN 100 UNIT/ML
500 SYRINGE INTRAVENOUS
Status: DISCONTINUED | OUTPATIENT
Start: 2023-01-01 | End: 2023-01-01 | Stop reason: HOSPADM

## 2023-01-01 RX ADMIN — SODIUM CHLORIDE 1000 ML: 9 INJECTION, SOLUTION INTRAVENOUS at 11:02

## 2023-01-01 RX ADMIN — SODIUM CHLORIDE: 9 INJECTION, SOLUTION INTRAVENOUS at 11:05

## 2023-01-01 RX ADMIN — SODIUM CHLORIDE 1500 MG: 9 INJECTION, SOLUTION INTRAVENOUS at 10:04

## 2023-01-01 RX ADMIN — DIATRIZOATE MEGLUMINE AND DIATRIZOATE SODIUM 30 ML: 660; 100 LIQUID ORAL; RECTAL at 10:06

## 2023-01-01 RX ADMIN — SODIUM CHLORIDE 1000 ML: 9 INJECTION, SOLUTION INTRAVENOUS at 11:06

## 2023-01-01 RX ADMIN — IOPAMIDOL 100 ML: 755 INJECTION, SOLUTION INTRAVENOUS at 10:06

## 2023-01-01 RX ADMIN — MAGNESIUM SULFATE HEPTAHYDRATE: 500 INJECTION, SOLUTION INTRAMUSCULAR; INTRAVENOUS at 09:01

## 2023-01-01 RX ADMIN — ASCORBIC ACID, VITAMIN A PALMITATE, CHOLECALCIFEROL, THIAMINE HYDROCHLORIDE, RIBOFLAVIN-5 PHOSPHATE SODIUM, PYRIDOXINE HYDROCHLORIDE, NIACINAMIDE, DEXPANTHENOL, ALPHA-TOCOPHEROL ACETATE, VITAMIN K1, FOLIC ACID, BIOTIN, CYANOCOBALAMIN: 200; 3300; 200; 6; 3.6; 6; 40; 15; 10; 150; 600; 60; 5 INJECTION, SOLUTION INTRAVENOUS at 12:01

## 2023-01-01 RX ADMIN — SODIUM CHLORIDE 1500 MG: 9 INJECTION, SOLUTION INTRAVENOUS at 11:05

## 2023-01-03 NOTE — PROGRESS NOTES
Subjective:       Patient ID: Tate Gautam is a 62 y.o. male.    Chief Complaint: I feel dizzy today.         Diagnosis:  NSCLC / Adenocarcinoma     Current Treatment:   Carbo/alimta q3w with radiation therapy (9/19/22 - 11/2/2022)      Treatment History: N/A    HPI    Tate Gautam  62 y.o.  male with past medical history significant for COPD on home oxygen, CAD s/p CABG, arthritis, IDDM and diabetic neuropathy / retinopathy referred for management of NSCLC/ADC.    He was followed in the [facility] for an abnormal CT of the chest. He was initially seen in October of 2021 & CT chest showed a new, 10 mm nodule & a chronic 4 mm nodule in the RML. Due to his overall poor health follow-up was suggested however patient missed follow-up appointments. He was then seen in July 2022 and had RML lesion biopsy which increased in size from 1 cm to 2.2 cm. 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     Interval History:   He returns to the clinic today for a three-week follow-up.  He is due for cycle 5 of carbo Alimta today. He reports fatigue and dizziness over the past few days. He reports not staying hydrated as it burns to drink and eat. His appetite is overall decreased due to burning when swallowing. He was previously prescribed Protonix daily, but is unsure if he is taking it. He did complete his radiation therapy 11/2/22. He continues to have excessive tearing of his bilateral eyes. He has not been seen by an eye doctor in years, even though he has been previously diagnosed with diabetic retinopathy. Today he would like to take a break from his treatment and wait for his upcoming scans to be completed.  He denies any fever, nausea, vomiting, diarrhea, numbness.     Past Medical History:   Diagnosis Date    AAA (abdominal aortic aneurysm)     Anxiety     Chronic obstructive lung disease     Depression     Diabetes mellitus, type 2     GERD (gastroesophageal reflux disease)     Hyperlipidemia      Hypertension     Lung mass     On home O2     2-4 L    Spinal stenosis       Past Surgical History:   Procedure Laterality Date    ANGIOPLASTY      BRAIN SURGERY      Trauma/ Subdural.    LUNG BIOPSY Right 08/30/2022    Dr. Shaikh    NECK SURGERY      x3    right 2nd toe amputation       Social History     Socioeconomic History    Marital status: Unknown   Tobacco Use    Smoking status: Every Day     Packs/day: 0.25     Years: 50.00     Pack years: 12.50     Types: Cigarettes    Smokeless tobacco: Never    Tobacco comments:     Now smoking 1/2 PPD   Substance and Sexual Activity    Alcohol use: Not Currently    Drug use: Yes     Types: Marijuana     Comment: 1 joint a day    Sexual activity: Not Currently   Social History Narrative    ** Merged History Encounter **           Family History   Problem Relation Age of Onset    Cancer Mother     Heart disease Father       Review of patient's allergies indicates:   Allergen Reactions    Duloxetine Other (See Comments)     unknown      Review of Systems   Constitutional:  Positive for fatigue. Negative for activity change, appetite change, chills, fever and unexpected weight change.   HENT:  Negative for ear discharge, facial swelling, mouth sores, nosebleeds, sinus pressure/congestion and sore throat.         Excessive tearing to bilateral eyes    Eyes:  Positive for visual disturbance (Diabetic retinopathy). Negative for pain and discharge.   Respiratory:  Negative for cough, chest tightness, shortness of breath and wheezing.    Cardiovascular:  Negative for chest pain, palpitations and leg swelling.        Burning in epigastric area    Gastrointestinal: Negative.  Negative for abdominal pain, blood in stool, change in bowel habit, constipation, diarrhea, nausea, vomiting and change in bowel habit.   Endocrine: Negative.    Genitourinary: Negative.  Negative for dysuria, frequency, hematuria and urgency.   Musculoskeletal:  Negative for arthralgias, back pain, gait  problem, joint swelling and myalgias.        Right rib pain. Weakness   Integumentary:  Negative for color change, pallor, rash and wound.   Allergic/Immunologic: Negative.  Negative for frequent infections.   Neurological:  Positive for dizziness. Negative for vertigo, syncope, weakness, light-headedness and numbness.   Hematological: Negative.  Negative for adenopathy. Does not bruise/bleed easily.   Psychiatric/Behavioral: Negative.  Negative for confusion and dysphoric mood. The patient is not nervous/anxious.    All other systems reviewed and are negative.      Objective:        Vitals:    01/03/23 0843   BP: (!) 147/70   Pulse: 66   Resp: 18   Temp: 97.7 °F (36.5 °C)         Physical Exam  Vitals reviewed.   Constitutional:       General: He is not in acute distress.     Appearance: Normal appearance. He is well-developed and normal weight. He is not ill-appearing.   HENT:      Head: Normocephalic and atraumatic.      Right Ear: External ear normal.      Left Ear: External ear normal.      Nose: Nose normal.      Mouth/Throat:      Mouth: Mucous membranes are moist.      Pharynx: Oropharynx is clear. No oropharyngeal exudate or posterior oropharyngeal erythema.   Eyes:      Extraocular Movements: Extraocular movements intact.      Conjunctiva/sclera: Conjunctivae normal.      Pupils: Pupils are equal, round, and reactive to light.      Comments: Excessive tearing to bilateral eyes    Cardiovascular:      Rate and Rhythm: Normal rate and regular rhythm.      Pulses: Normal pulses.      Heart sounds: Normal heart sounds. No murmur heard.    No friction rub. No gallop.   Pulmonary:      Effort: Pulmonary effort is normal. No respiratory distress.      Breath sounds: Normal breath sounds. No wheezing, rhonchi or rales.   Abdominal:      General: Abdomen is flat. Bowel sounds are normal. There is no distension.      Palpations: Abdomen is soft. There is no mass.      Tenderness: There is no abdominal tenderness.  There is no guarding.   Musculoskeletal:         General: No swelling, tenderness or deformity. Normal range of motion.      Cervical back: Normal range of motion and neck supple.      Right lower leg: No edema.      Left lower leg: No edema.      Comments: Walks with cane   Lymphadenopathy:      Cervical: No cervical adenopathy.   Skin:     General: Skin is warm and dry.      Findings: No erythema, lesion or rash.   Neurological:      General: No focal deficit present.      Mental Status: He is alert and oriented to person, place, and time. Mental status is at baseline.      Cranial Nerves: No cranial nerve deficit.      Gait: Gait normal.   Psychiatric:         Mood and Affect: Mood normal.         Behavior: Behavior normal.         Thought Content: Thought content normal.         Judgment: Judgment normal.       LABS AND IMAGING REVIEWED IN EPIC        CT chest 10/4/21: Interim development of 1 cm subpleural nodule lateral right lower lobe. 4 mm nodule lateral right middle lobe    PET 1/5/2022: Right middle lobe nodule demonstrates no significant FDG activity and is stable in size     CT Chest 7/18/2022: Interim increase in size of prior 1 cm right middle lobe subpleural nodule now measuring 2.2 cm.  Interim increase in size of prior 4 mm subpleural nodule lateral right middle lobe now measuring 6 mm. Pinpoint 2 mm subpleural nodules at the left upper and left lower lung. Partial thickening of the left lateral major fissure with the atelectasis and/or scarring at the left lingular segment.    PET 9/8/22: RML hypermetabolc nodule. 2 smaller lesions with no avid activity with larger measure 0.9 x 0.7 cm. Mild uptake with SUV at 3.0 & measure 1.8 x 1.0 cm in right hilum worrisome for metastatic disease.     Assessment:     NSCLC / Adenocarcinoma Stage IIB or IIIA as can be T1c (2.2cm) or T3 (separate tumor nodules in RML, though not avid) N1 M0   - PD-L1: 85%. Negative for ALK, BRAF, EGFR, ROS1, TrkA/B/C.  MSI-S/MMR-P .   - CT chest 10/4/21: 1 cm subpleural RLL nodule. 4 mm lateral RML nodule .   - PET 1/5/2022: RML nodule demonstrates no significant FDG activity and is stable in size   - CT Chest 7/18/2022: Interim increase in size of prior 1 cm RML subpleural nodule now measuring 2.2 cm.  Interim increase in size of prior 4 mm subpleural nodule lateral RML lobe now measuring 6 mm. Pinpoint 2 mm subpleural nodules at the left upper and left lower lung. Partial thickening of the left lateral major fissure with the atelectasis and/or scarring at the left lingular segment.  - IR guided biopsy on 8/30/2022 with pathology showing pulmonary ADC.   - PET 9/8/22: RML hypermetabolc nodule. 2 smaller lesions with no avid activity with larger measure 0.9 x 0.7 cm. Mild uptake with SUV at 3.0 & measure 1.8 x 1.0 cm in right hilum worrisome for metastatic disease.   - MRI Brain 9/7/22: no evidence of metastatic disease. Prior right occipitoparietal craniotomy with localized right temporal lobe encephalomalacia.   - Based on Stage IIB or IIIA with COPD on home oxygen, not a candidate for surgery, but will benefit from C-XRT.   - Started 9/19/22 - ended 11-2-22.   Start with carboplatin and pemetrexed at [facility] - 09/21/22    ECOG Performance status: 3    Chemotherapy  Carboplatin/pemetrexed q3w started 9/21/22   s/w folic acid and vitamin b12 while being started on pemetrexed.   He is due for cycle 5 today.  He did receive IV fluids with his last treatment, which did help him feel better. He does feel worse this week and would like to hold off on treatment today. CBC is stable. Creatinine continues to increase and mag is 1.4 today. Will hold chemo today.     Palliative care:   Counseled on smoking cessation, nicotine patches prescribed   c/w magic mouthwash     Hyperkalemia:  resolved     Decrease appetite  nutrition consult  Ramez Vang referral for supplements   Weigh is stable today. Encouraged protein shake  supplements    Pain  Right rib   will consult pain management   He reports he did not go. Will resend referral if pain is not controlled with current medication regimen  Pain is currently controlled with pain medication   Hypomagnesia   Mag 1.4  Will add two grams of mag today in 1L NS for hydration.   Vision  Previously diagnosed with diabetic retinopathy.  Has not been followed by Ophthalmology in years.  He would like to be referred to a new ophthalmologist if possible.  I will refer to Dr. Carrasco in [place] for ophthalmology.  Generalized weakness  Overall deconditioning most likely secondary to chemotherapy, radiation, and cancer diagnosis.  Does currently walk with a cane.  He did have a fall prior to his last office visit.  He would like to try physical therapy in order to gain some strength back.      Plan:   Hold C5 chemo today.   2 grams mag in 1L NS today.  Continue calcium with vitamin-D  Referral placed for physical therapy.  Referral placed for Ophthalmology, Dr. Carrasco in [place].  CT chest scheduled for 1/17/22.  Return to the clinic in 3 week with MD only for scan review/labs/treat .    The patient was seen, interviewed and examined. Pertinent lab and radiology studies were reviewed.     Pt instructed to call in the interim for any new or worsening concerns or symptoms     Pia Mccoy, REEMA-C  Oncology/Hematology   [facility]

## 2023-01-23 NOTE — PROGRESS NOTES
Subjective:       Patient ID: Tate Gautam is a 62 y.o. male.    Chief Complaint: I feel dizzy today.         Diagnosis:  NSCLC / Adenocarcinoma     Current Treatment:   Carbo/alimta q3w with radiation therapy (9/19/22 - 11/2/2022)      Treatment History: N/A    HPI    Tate Gautam  62 y.o.  male with past medical history significant for COPD on home oxygen, CAD s/p CABG, arthritis, IDDM and diabetic neuropathy / retinopathy referred for management of NSCLC/ADC.    He was followed in the [facility] for an abnormal CT of the chest. He was initially seen in October of 2021 & CT chest showed a new, 10 mm nodule & a chronic 4 mm nodule in the RML. Due to his overall poor health follow-up was suggested however patient missed follow-up appointments. He was then seen in July 2022 and had RML lesion biopsy which increased in size from 1 cm to 2.2 cm. 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     Interval History:   01/24/2023:  Today is the 1st time I meeting with the patient and his sister.  He returns to the clinic today for a three-week follow-up.  He is due for cycle 5 of carbo Alimta today.  He is here with his sister.  He reports fatigue and dizziness over the past few days. He reports not staying hydrated as it burns to drink and eat. His appetite is overall decreased due to burning when swallowing. He was previously prescribed Protonix daily, but is unsure if he is taking it. He did complete his radiation therapy 11/2/22. He continues to have excessive tearing of his bilateral eyes. He has not been seen by an eye doctor in years, even though he has been previously diagnosed with diabetic retinopathy.  He has several falls since last visit.  He has sores on his hands, arms and forehead .  He falls asleep easily during the visit.  He looks weak and debilitated.  He is not sure if he will like to continue chemotherapy or not.   He denies any fever, nausea, vomiting, diarrhea, numbness.        Past Medical History:   Diagnosis Date    AAA (abdominal aortic aneurysm)     Anxiety     Chronic obstructive lung disease     Depression     Diabetes mellitus, type 2     GERD (gastroesophageal reflux disease)     Hyperlipidemia     Hypertension     Lung mass     On home O2     2-4 L    Spinal stenosis       Past Surgical History:   Procedure Laterality Date    ANGIOPLASTY      BRAIN SURGERY      Trauma/ Subdural.    LUNG BIOPSY Right 08/30/2022    Dr. Shaikh    NECK SURGERY      x3    right 2nd toe amputation       Social History     Socioeconomic History    Marital status: Unknown   Tobacco Use    Smoking status: Every Day     Packs/day: 0.25     Years: 50.00     Pack years: 12.50     Types: Cigarettes    Smokeless tobacco: Never    Tobacco comments:     Now smoking 1/2 PPD   Substance and Sexual Activity    Alcohol use: Not Currently    Drug use: Yes     Types: Marijuana     Comment: 1 joint a day    Sexual activity: Not Currently   Social History Narrative    ** Merged History Encounter **           Family History   Problem Relation Age of Onset    Cancer Mother     Heart disease Father       Review of patient's allergies indicates:   Allergen Reactions    Duloxetine Other (See Comments)     unknown      Review of Systems   Constitutional:  Positive for fatigue. Negative for activity change, appetite change, chills, fever and unexpected weight change.   HENT:  Negative for ear discharge, facial swelling, mouth sores, nosebleeds, sinus pressure/congestion and sore throat.         Excessive tearing to bilateral eyes    Eyes:  Positive for visual disturbance (Diabetic retinopathy). Negative for pain and discharge.   Respiratory:  Negative for cough, chest tightness, shortness of breath and wheezing.    Cardiovascular:  Negative for chest pain, palpitations and leg swelling.        Burning in epigastric area    Gastrointestinal: Negative.  Negative for abdominal pain, blood in stool, change in bowel habit,  constipation, diarrhea, nausea, vomiting and change in bowel habit.   Endocrine: Negative.    Genitourinary: Negative.  Negative for dysuria, frequency, hematuria and urgency.   Musculoskeletal:  Negative for arthralgias, back pain, gait problem, joint swelling and myalgias.        Right rib pain. Weakness   Integumentary:  Negative for color change, pallor, rash and wound.   Allergic/Immunologic: Negative.  Negative for frequent infections.   Neurological:  Positive for dizziness and weakness. Negative for vertigo, syncope, light-headedness and numbness.   Hematological: Negative.  Negative for adenopathy. Does not bruise/bleed easily.   Psychiatric/Behavioral: Negative.  Negative for confusion and dysphoric mood. The patient is not nervous/anxious.    All other systems reviewed and are negative.      Objective:        Vitals:    01/24/23 1125   BP: 99/64   Pulse: 77   Resp: 20   Temp: 97.5 °F (36.4 °C)           Physical Exam  Vitals reviewed.   Constitutional:       General: He is not in acute distress.     Appearance: He is ill-appearing.      Comments: Looks weak and deconditioned   HENT:      Head: Normocephalic and atraumatic.      Right Ear: External ear normal.      Left Ear: External ear normal.      Nose: Nose normal.      Mouth/Throat:      Mouth: Mucous membranes are moist.      Pharynx: Oropharynx is clear. No oropharyngeal exudate or posterior oropharyngeal erythema.   Eyes:      Extraocular Movements: Extraocular movements intact.      Conjunctiva/sclera: Conjunctivae normal.      Pupils: Pupils are equal, round, and reactive to light.      Comments: Excessive tearing to bilateral eyes    Cardiovascular:      Rate and Rhythm: Normal rate and regular rhythm.      Pulses: Normal pulses.      Heart sounds: Normal heart sounds. No murmur heard.    No friction rub. No gallop.   Pulmonary:      Effort: Pulmonary effort is normal. No respiratory distress.      Breath sounds: Decreased breath sounds present.  No wheezing, rhonchi or rales.   Abdominal:      General: Abdomen is flat. Bowel sounds are normal. There is no distension.      Palpations: Abdomen is soft. There is no mass.      Tenderness: There is no abdominal tenderness. There is no guarding.   Musculoskeletal:         General: No swelling, tenderness or deformity. Normal range of motion.      Cervical back: Normal range of motion and neck supple.      Right lower leg: No edema.      Left lower leg: No edema.      Comments: Walks with cane   Lymphadenopathy:      Cervical: No cervical adenopathy.   Skin:     General: Skin is warm and dry.      Findings: No erythema, lesion or rash.   Neurological:      Mental Status: He is oriented to person, place, and time.      Cranial Nerves: Cranial nerves 2-12 are intact.   Psychiatric:         Attention and Perception: He is inattentive.         Mood and Affect: Affect is flat.         Behavior: Behavior is cooperative.         Thought Content: Thought content does not include suicidal ideation.         Cognition and Memory: He exhibits impaired recent memory.       LABS AND IMAGING REVIEWED IN EPIC        CT chest 10/4/21: Interim development of 1 cm subpleural nodule lateral right lower lobe. 4 mm nodule lateral right middle lobe    PET 1/5/2022: Right middle lobe nodule demonstrates no significant FDG activity and is stable in size     CT Chest 7/18/2022: Interim increase in size of prior 1 cm right middle lobe subpleural nodule now measuring 2.2 cm.  Interim increase in size of prior 4 mm subpleural nodule lateral right middle lobe now measuring 6 mm. Pinpoint 2 mm subpleural nodules at the left upper and left lower lung. Partial thickening of the left lateral major fissure with the atelectasis and/or scarring at the left lingular segment.    PET 9/8/22: RML hypermetabolc nodule. 2 smaller lesions with no avid activity with larger measure 0.9 x 0.7 cm. Mild uptake with SUV at 3.0 & measure 1.8 x 1.0 cm in right  hilum worrisome for metastatic disease.     Assessment:     NSCLC / Adenocarcinoma Stage IIB or IIIA as can be T1c (2.2cm) or T3 (separate tumor nodules in RML, though not avid) N1 M0   - PD-L1: 85%. Negative for ALK, BRAF, EGFR, ROS1, TrkA/B/C. MSI-S/MMR-P .   - CT chest 10/4/21: 1 cm subpleural RLL nodule. 4 mm lateral RML nodule .   - PET 1/5/2022: RML nodule demonstrates no significant FDG activity and is stable in size   - CT Chest 7/18/2022: Interim increase in size of prior 1 cm RML subpleural nodule now measuring 2.2 cm.  Interim increase in size of prior 4 mm subpleural nodule lateral RML lobe now measuring 6 mm. Pinpoint 2 mm subpleural nodules at the left upper and left lower lung. Partial thickening of the left lateral major fissure with the atelectasis and/or scarring at the left lingular segment.  - IR guided biopsy on 8/30/2022 with pathology showing pulmonary ADC.   - PET 9/8/22: RML hypermetabolc nodule. 2 smaller lesions with no avid activity with larger measure 0.9 x 0.7 cm. Mild uptake with SUV at 3.0 & measure 1.8 x 1.0 cm in right hilum worrisome for metastatic disease.   - MRI Brain 9/7/22: no evidence of metastatic disease. Prior right occipitoparietal craniotomy with localized right temporal lobe encephalomalacia.   - Based on Stage IIB or IIIA with COPD on home oxygen, not a candidate for surgery, but will benefit from C-XRT.   - Started 9/19/22 - ended 11-2-22.   Start with carboplatin and pemetrexed at [facility] - 09/21/22    ECOG Performance status: 3    Chemotherapy  Carboplatin/pemetrexed q3w started 9/21/22   s/w folic acid and vitamin b12 while being started on pemetrexed.   He is due for cycle 5 today.    Palliative care:   Counseled on smoking cessation, nicotine patches prescribed   c/w magic mouthwash     Hyperkalemia:  resolved     Decrease appetite  nutrition consult  Ramez Vang referral for supplements   Weigh is stable today. Encouraged protein shake  supplements    Pain  Right rib   will consult pain management   He reports he did not go. Will resend referral if pain is not controlled with current medication regimen  Pain is currently controlled with pain medication   Hypomagnesia     Vision  Previously diagnosed with diabetic retinopathy.  Has not been followed by Ophthalmology in years.  He would like to be referred to a new ophthalmologist if possible.  I will refer to Dr. Carrasco in [place] for ophthalmology.  Generalized weakness  Overall deconditioning most likely secondary to chemotherapy, radiation, and cancer diagnosis.  Does currently walk with a cane.  He did have a fall prior to his last office visit.  He would like to try physical therapy in order to gain some strength back.      Plan:     Patient performance status declining.  He has several falls not sure if it is secondary to hypoglycemia.  They will address diabetes with his primary care physician.  Otherwise will hold treatment as he is too weak and debilitated to have it today.    Hold C5 chemo today.   1L NS with MVI today.  Continue calcium with vitamin-D  Referral placed for physical therapy already  Referral placed for Ophthalmology, Dr. Carrasco in [place].    Return to the clinic in 2 week with MD only for possible treatment  This patient doing better than we can consider chemotherapy with Keytruda versus Keytruda monotherapy.  Instructed to contact primary care provider for management of uncontrolled diabetes.  Labs prior to visit  Encourage to call or message us for any questions or problems  The patient was given ample opportunity to ask questions, and to the best of my abilities, all questions answered to satisfaction; patient demonstrated understanding of what we discussed and agreeable to the plan.       Snow Kam MD  Hematology/Oncology

## 2023-02-07 NOTE — PROGRESS NOTES
Subjective:       Patient ID: Tate Gautam is a 62 y.o. male.    Chief Complaint: I feel dizzy today.         Diagnosis:  NSCLC / Adenocarcinoma     Current Treatment:   Durvalumab 1500mg q4w  C1 to begin 2/14/2023      Treatment History:   Carbo/alimta q3w with radiation therapy (9/19/22 - 11/2/2022)  Completed 4 cycles of Carbo/almta q3w on 12/13/2023    HPI    Tate Gautam  62 y.o.  male with past medical history significant for COPD on home oxygen, CAD s/p CABG, arthritis, IDDM and diabetic neuropathy / retinopathy referred for management of NSCLC/ADC.    He was followed in the [facility] for an abnormal CT of the chest. He was initially seen in October of 2021 & CT chest showed a new, 10 mm nodule & a chronic 4 mm nodule in the RML. Due to his overall poor health follow-up was suggested however patient missed follow-up appointments. He was then seen in July 2022 and had RML lesion biopsy which increased in size from 1 cm to 2.2 cm. 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     Interval History:   He returns to the clinic today for a 2 week follow-up.  Cycle 5 of carbo/Alimta was held last week due to low blood sugar, weakness, and falling at home.  He was given 1 L normal saline. Today, he feels better. He did have an OV with Dr. Lerma who changed some of his diabetic medications. He was also prescribed doxycycline for the sores on his hands, arms, and forehead. He has an appt with ophthalmology, Dr. Felicitas Stephens, tomorrow. Per Dr. Kam, he is due to begin immunotherapy with Durvalumab. Therefore, chemotherapy education was performed today and will plan to begin therapy in one week. He denies any SOB, CP, headache, nausea, vomiting, diarrhea, or constipation.       Past Medical History:   Diagnosis Date    AAA (abdominal aortic aneurysm)     Anxiety     Chronic obstructive lung disease     Depression     Diabetes mellitus, type 2     GERD (gastroesophageal reflux disease)      Hyperlipidemia     Hypertension     Lung mass     On home O2     2-4 L    Spinal stenosis       Past Surgical History:   Procedure Laterality Date    ANGIOPLASTY      BRAIN SURGERY      Trauma/ Subdural.    LUNG BIOPSY Right 08/30/2022    Dr. Shaikh    NECK SURGERY      x3    right 2nd toe amputation       Social History     Socioeconomic History    Marital status: Unknown   Tobacco Use    Smoking status: Every Day     Packs/day: 0.25     Years: 50.00     Pack years: 12.50     Types: Cigarettes    Smokeless tobacco: Never    Tobacco comments:     Now smoking 1/2 PPD   Substance and Sexual Activity    Alcohol use: Not Currently    Drug use: Yes     Types: Marijuana     Comment: 1 joint a day    Sexual activity: Not Currently   Social History Narrative    ** Merged History Encounter **           Family History   Problem Relation Age of Onset    Cancer Mother     Heart disease Father       Review of patient's allergies indicates:   Allergen Reactions    Duloxetine Other (See Comments)     unknown      Review of Systems   Constitutional:  Positive for fatigue. Negative for activity change, appetite change, chills, fever and unexpected weight change.   HENT:  Negative for ear discharge, facial swelling, mouth sores, nosebleeds, sinus pressure/congestion and sore throat.         Excessive tearing to bilateral eyes    Eyes:  Positive for visual disturbance (Diabetic retinopathy). Negative for pain and discharge.   Respiratory:  Negative for cough, chest tightness, shortness of breath and wheezing.    Cardiovascular:  Negative for chest pain, palpitations and leg swelling.   Gastrointestinal: Negative.  Negative for abdominal pain, blood in stool, change in bowel habit, constipation, diarrhea, nausea, vomiting and change in bowel habit.   Endocrine: Negative.    Genitourinary: Negative.  Negative for dysuria, frequency, hematuria and urgency.   Musculoskeletal:  Negative for arthralgias, back pain, gait problem, joint  swelling and myalgias.        Right rib pain. Weakness   Integumentary:  Negative for color change, pallor, rash and wound.   Allergic/Immunologic: Negative.  Negative for frequent infections.   Neurological:  Positive for weakness. Negative for dizziness, vertigo, syncope, light-headedness and numbness.   Hematological: Negative.  Negative for adenopathy. Does not bruise/bleed easily.   Psychiatric/Behavioral: Negative.  Negative for confusion and dysphoric mood. The patient is not nervous/anxious.    All other systems reviewed and are negative.      Objective:        Vitals:    02/07/23 1037   BP: 117/75   Pulse: 76   Resp: 18   Temp: 97.9 °F (36.6 °C)           Physical Exam  Vitals reviewed.   Constitutional:       General: He is not in acute distress.     Appearance: He is ill-appearing.      Comments: Looks weak and deconditioned   HENT:      Head: Normocephalic and atraumatic.      Right Ear: External ear normal.      Left Ear: External ear normal.      Nose: Nose normal.      Mouth/Throat:      Mouth: Mucous membranes are moist.      Pharynx: Oropharynx is clear. No oropharyngeal exudate or posterior oropharyngeal erythema.   Eyes:      Extraocular Movements: Extraocular movements intact.      Conjunctiva/sclera: Conjunctivae normal.      Pupils: Pupils are equal, round, and reactive to light.      Comments: Excessive tearing to bilateral eyes    Cardiovascular:      Rate and Rhythm: Normal rate and regular rhythm.      Pulses: Normal pulses.      Heart sounds: Normal heart sounds. No murmur heard.    No friction rub. No gallop.   Pulmonary:      Effort: Pulmonary effort is normal. No respiratory distress.      Breath sounds: Decreased breath sounds present. No wheezing, rhonchi or rales.   Abdominal:      General: Abdomen is flat. Bowel sounds are normal. There is no distension.      Palpations: Abdomen is soft. There is no mass.      Tenderness: There is no abdominal tenderness. There is no guarding.    Musculoskeletal:         General: No swelling, tenderness or deformity. Normal range of motion.      Cervical back: Normal range of motion and neck supple.      Right lower leg: No edema.      Left lower leg: No edema.      Comments: Walks with cane   Lymphadenopathy:      Cervical: No cervical adenopathy.   Skin:     General: Skin is warm and dry.      Findings: No erythema, lesion or rash.   Neurological:      Mental Status: He is oriented to person, place, and time.      Cranial Nerves: Cranial nerves 2-12 are intact.      Gait: Gait abnormal (walks with cane).   Psychiatric:         Attention and Perception: He is attentive.         Mood and Affect: Mood normal. Affect is not flat.         Behavior: Behavior normal. Behavior is cooperative.         Thought Content: Thought content normal. Thought content does not include suicidal ideation.         Cognition and Memory: He does not exhibit impaired recent memory.         Judgment: Judgment normal.       LABS AND IMAGING REVIEWED IN EPIC        CT chest 10/4/21: Interim development of 1 cm subpleural nodule lateral right lower lobe. 4 mm nodule lateral right middle lobe    PET 1/5/2022: Right middle lobe nodule demonstrates no significant FDG activity and is stable in size     CT Chest 7/18/2022: Interim increase in size of prior 1 cm right middle lobe subpleural nodule now measuring 2.2 cm.  Interim increase in size of prior 4 mm subpleural nodule lateral right middle lobe now measuring 6 mm. Pinpoint 2 mm subpleural nodules at the left upper and left lower lung. Partial thickening of the left lateral major fissure with the atelectasis and/or scarring at the left lingular segment.    PET 9/8/22: RML hypermetabolc nodule. 2 smaller lesions with no avid activity with larger measure 0.9 x 0.7 cm. Mild uptake with SUV at 3.0 & measure 1.8 x 1.0 cm in right hilum worrisome for metastatic disease.     Assessment:     NSCLC / Adenocarcinoma Stage IIB or IIIA as can be  T1c (2.2cm) or T3 (separate tumor nodules in RML, though not avid) N1 M0   - PD-L1: 85%. Negative for ALK, BRAF, EGFR, ROS1, TrkA/B/C. MSI-S/MMR-P .   - CT chest 10/4/21: 1 cm subpleural RLL nodule. 4 mm lateral RML nodule .   - PET 1/5/2022: RML nodule demonstrates no significant FDG activity and is stable in size   - CT Chest 7/18/2022: Interim increase in size of prior 1 cm RML subpleural nodule now measuring 2.2 cm.  Interim increase in size of prior 4 mm subpleural nodule lateral RML lobe now measuring 6 mm. Pinpoint 2 mm subpleural nodules at the left upper and left lower lung. Partial thickening of the left lateral major fissure with the atelectasis and/or scarring at the left lingular segment.  - IR guided biopsy on 8/30/2022 with pathology showing pulmonary ADC.   - PET 9/8/22: RML hypermetabolc nodule. 2 smaller lesions with no avid activity with larger measure 0.9 x 0.7 cm. Mild uptake with SUV at 3.0 & measure 1.8 x 1.0 cm in right hilum worrisome for metastatic disease.   - MRI Brain 9/7/22: no evidence of metastatic disease. Prior right occipitoparietal craniotomy with localized right temporal lobe encephalomalacia.   - Based on Stage IIB or IIIA with COPD on home oxygen, not a candidate for surgery, but will benefit from C-XRT.   - Started 9/19/22 - ended 11-2-22.  Carbo/alimta q3w with radiation therapy (9/19/22 - 11/2/2022)  Completed 4 cycles of Carbo/alimta q3w on 12/13/2023  Due to begin Durvalumab at this time.     ECOG Performance status: 2    Chemotherapy  Completed Carboplatin/pemetrexed q3w x4 cycles with concurrent XRT  Due to begin immunotherapy with Durvalumab 1500mg q4w. Orders placed in treatment plan. Will plan to begin therapy in one week (2/14/2023)  Palliative care:   Counseled on smoking cessation, nicotine patches prescribed   c/w magic mouthwash     Hyperkalemia:  resolved     Decrease appetite  nutrition consult  Ramez Vang referral for supplements   Weigh is stable today.  Encouraged protein shake supplements    Pain  Right rib   will consult pain management   He reports he did not go. Will resend referral if pain is not controlled with current medication regimen  Pain is currently controlled with pain medication   Hypomagnesia   Resolved     Vision  Appt scheduled with Dr. Felicitas Stephens in [place] tomorrow (2/8/2023) for further evaluation.     Generalized weakness  Overall deconditioning most likely secondary to chemotherapy, radiation, and cancer diagnosis.  Does currently walk with a cane.  Hx of falls.  Currently being followed by HH & PT  Elevated creatinine   Will give 1L NS today. Encouraged hydration.       Plan:   Chemotherapy education provided in detail. Consents were signed. Plan was ordered.  IV fluids today due to generalized weakness and elevated creatinine.  Opthalmology as scheduled.   Continue with HH & PT.   RTC in one week with MD for FU/lab/treatment.    Chemotherapy teaching was provided to patient. The plan of care including chemotherapy regimen, schedule expectations, potential side effects, as well as prevention/management of side effects were discussed in detail. Office contact information was provided along with instructions on symptoms that warrant a call to the office, for example a temperature of > 100.5, uncontrolled side effects, severe fatigue etc. Time was given for patient/ family members to ask questions. All questions answered to the satisfaction of the patient and they verbalized understanding. They were instructed to call with any concerns or additional questions.     I spent 20 minutes preparing for education session reviewing clinic note, labs, scans, pathology, and treatment plan. I spent 40 minutes face-to-face with patient.     Pia Mccoy, REEMA-C  Oncology/Hematology   [facility]

## 2023-02-13 NOTE — PROGRESS NOTES
Subjective:     Diagnosis:  NSCLC / Adenocarcinoma     Current Treatment:   Durvalumab 1500mg q4w  C1 to begin 2/14/2023    Treatment History:   Carbo/alimta q3w with radiation therapy (9/19/22 - 11/2/2022)  Completed 4 cycles of Carbo/almta q3w on 12/13/2023      Chief Complaint: Lung Cancer (1 week f/u with labs-- Patient reports no new concerns. Patient seen eye doctor, Felicitas Stephens, on 2/8. He is having cataract surgery on left eye 3/8/23 once cleared by cardiologist, 4-6 weeks later he will have right eye cataract surgery)      HPI    Tate FELIPE Gautam  62 y.o.  male with past medical history significant for COPD on home oxygen, CAD s/p CABG, arthritis, IDDM and diabetic neuropathy / retinopathy referred for management of NSCLC/ADC.    He was followed in the [facility] for an abnormal CT of the chest. He was initially seen in October of 2021 & CT chest showed a new, 10 mm nodule & a chronic 4 mm nodule in the RML. Due to his overall poor health follow-up was suggested however patient missed follow-up appointments. He was then seen in July 2022 and had RML lesion biopsy which increased in size from 1 cm to 2.2 cm. 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     Interval History:   He returns to the clinic today in follow-up. He is with his sister. He completed 4 cycles of chemotherapy.  Cycle 5 of carbo/Alimta was held last week due to low blood sugar, weakness, and falling at home.  He was given 1 L normal saline. Today, he feels better. He did have an OV with Dr. Lerma who changed some of his diabetic medications. He was also prescribed doxycycline for the sores on his hands, arms, and forehead.     02/14/2023:  Patient is here today in follow-up with his sister .He is due to begin immunotherapy with Durvalumab. Therefore, chemotherapy education was performed today and will plan to begin therapy in one week. He denies any SOB, CP, headache, nausea, vomiting, diarrhea, or constipation.  Patient  was seen by ophthalmologist Dr. Stephens and he is in need of cataract surgery as his vision has deteriorated and is very poor.        Past Medical History:   Diagnosis Date    AAA (abdominal aortic aneurysm)     Anxiety     Chronic obstructive lung disease     Depression     Diabetes mellitus, type 2     GERD (gastroesophageal reflux disease)     Hyperlipidemia     Hypertension     Lung mass     On home O2     2-4 L    Spinal stenosis       Past Surgical History:   Procedure Laterality Date    ANGIOPLASTY      BRAIN SURGERY      Trauma/ Subdural.    LUNG BIOPSY Right 08/30/2022    Dr. Shaikh    NECK SURGERY      x3    right 2nd toe amputation       Social History     Socioeconomic History    Marital status: Unknown   Tobacco Use    Smoking status: Every Day     Packs/day: 0.25     Years: 50.00     Pack years: 12.50     Types: Cigarettes    Smokeless tobacco: Never    Tobacco comments:     Now smoking 1/2 PPD   Substance and Sexual Activity    Alcohol use: Not Currently    Drug use: Yes     Types: Marijuana     Comment: 1 joint a day    Sexual activity: Not Currently   Social History Narrative    ** Merged History Encounter **           Family History   Problem Relation Age of Onset    Cancer Mother     Heart disease Father       Review of patient's allergies indicates:   Allergen Reactions    Duloxetine Other (See Comments)     unknown      Review of Systems   Constitutional:  Positive for fatigue. Negative for activity change, appetite change, chills, fever and unexpected weight change.   HENT:  Negative for ear discharge, facial swelling, mouth sores, nosebleeds, sinus pressure/congestion and sore throat.         Excessive tearing to bilateral eyes    Eyes:  Positive for visual disturbance (Diabetic retinopathy). Negative for pain and discharge.   Respiratory:  Negative for cough, chest tightness, shortness of breath and wheezing.    Cardiovascular:  Negative for chest pain, palpitations and leg swelling.    Gastrointestinal: Negative.  Negative for abdominal pain, blood in stool, change in bowel habit, constipation, diarrhea, nausea, vomiting and change in bowel habit.   Endocrine: Negative.    Genitourinary: Negative.  Negative for dysuria, frequency, hematuria and urgency.   Musculoskeletal:  Negative for arthralgias, back pain, gait problem, joint swelling and myalgias.        Right rib pain. Weakness   Integumentary:  Negative for color change, pallor, rash and wound.   Allergic/Immunologic: Negative.  Negative for frequent infections.   Neurological:  Positive for weakness. Negative for dizziness, vertigo, syncope, light-headedness and numbness.   Hematological: Negative.  Negative for adenopathy. Does not bruise/bleed easily.   Psychiatric/Behavioral: Negative.  Negative for confusion and dysphoric mood. The patient is not nervous/anxious.    All other systems reviewed and are negative.      Objective:        Vitals:    02/14/23 0955   BP: 97/63   Pulse: 71   Resp: 20   Temp: 97.6 °F (36.4 °C)             Physical Exam  Vitals reviewed.   Constitutional:       General: He is not in acute distress.     Appearance: He is ill-appearing.      Comments: Looks weak and deconditioned   HENT:      Head: Normocephalic and atraumatic.      Right Ear: External ear normal.      Left Ear: External ear normal.      Nose: Nose normal.      Mouth/Throat:      Mouth: Mucous membranes are moist.      Pharynx: Oropharynx is clear. No oropharyngeal exudate or posterior oropharyngeal erythema.   Eyes:      Extraocular Movements: Extraocular movements intact.      Conjunctiva/sclera: Conjunctivae normal.      Pupils: Pupils are equal, round, and reactive to light.      Comments: Excessive tearing to bilateral eyes    Cardiovascular:      Rate and Rhythm: Normal rate and regular rhythm.      Pulses: Normal pulses.      Heart sounds: Normal heart sounds. No murmur heard.    No friction rub. No gallop.   Pulmonary:      Effort:  Pulmonary effort is normal. No respiratory distress.      Breath sounds: Decreased breath sounds present. No wheezing, rhonchi or rales.   Abdominal:      General: Abdomen is flat. Bowel sounds are normal. There is no distension.      Palpations: Abdomen is soft. There is no mass.      Tenderness: There is no abdominal tenderness. There is no guarding.   Musculoskeletal:         General: No swelling, tenderness or deformity. Normal range of motion.      Cervical back: Normal range of motion and neck supple.      Right lower leg: No edema.      Left lower leg: No edema.      Comments: Walks with cane   Lymphadenopathy:      Cervical: No cervical adenopathy.   Skin:     General: Skin is warm and dry.      Findings: No erythema, lesion or rash.   Neurological:      Mental Status: He is oriented to person, place, and time.      Cranial Nerves: Cranial nerves 2-12 are intact.      Gait: Gait abnormal (walks with cane).   Psychiatric:         Attention and Perception: He is attentive.         Mood and Affect: Mood normal. Affect is not flat.         Behavior: Behavior normal. Behavior is cooperative.         Thought Content: Thought content normal. Thought content does not include suicidal ideation.         Cognition and Memory: He does not exhibit impaired recent memory.         Judgment: Judgment normal.         ECOG SCORE    2 - Capable of all selfcare but unable to carry out any work activities, active > 50% of hours         LABS AND IMAGING REVIEWED IN EPIC        CT chest 10/4/21: Interim development of 1 cm subpleural nodule lateral right lower lobe. 4 mm nodule lateral right middle lobe    PET 1/5/2022: Right middle lobe nodule demonstrates no significant FDG activity and is stable in size     CT Chest 7/18/2022: Interim increase in size of prior 1 cm right middle lobe subpleural nodule now measuring 2.2 cm.  Interim increase in size of prior 4 mm subpleural nodule lateral right middle lobe now measuring 6 mm.  Pinpoint 2 mm subpleural nodules at the left upper and left lower lung. Partial thickening of the left lateral major fissure with the atelectasis and/or scarring at the left lingular segment.    PET 9/8/22: RML hypermetabolc nodule. 2 smaller lesions with no avid activity with larger measure 0.9 x 0.7 cm. Mild uptake with SUV at 3.0 & measure 1.8 x 1.0 cm in right hilum worrisome for metastatic disease.     Assessment:     1. Adenocarcinoma, lung, right    2. Immunotherapy encounter        NSCLC / Adenocarcinoma Stage IIB or IIIA as can be T1c (2.2cm) or T3 (separate tumor nodules in RML, though not avid) N1 M0   - PD-L1: 85%. Negative for ALK, BRAF, EGFR, ROS1, TrkA/B/C. MSI-S/MMR-P .   - CT chest 10/4/21: 1 cm subpleural RLL nodule. 4 mm lateral RML nodule .   - PET 1/5/2022: RML nodule demonstrates no significant FDG activity and is stable in size   - CT Chest 7/18/2022: Interim increase in size of prior 1 cm RML subpleural nodule now measuring 2.2 cm.  Interim increase in size of prior 4 mm subpleural nodule lateral RML lobe now measuring 6 mm. Pinpoint 2 mm subpleural nodules at the left upper and left lower lung. Partial thickening of the left lateral major fissure with the atelectasis and/or scarring at the left lingular segment.  - IR guided biopsy on 8/30/2022 with pathology showing pulmonary ADC.   - PET 9/8/22: RML hypermetabolc nodule. 2 smaller lesions with no avid activity with larger measure 0.9 x 0.7 cm. Mild uptake with SUV at 3.0 & measure 1.8 x 1.0 cm in right hilum worrisome for metastatic disease.   - MRI Brain 9/7/22: no evidence of metastatic disease. Prior right occipitoparietal craniotomy with localized right temporal lobe encephalomalacia.   - Based on Stage IIB or IIIA with COPD on home oxygen, not a candidate for surgery, but will benefit from C-XRT.   - Started 9/19/22 - ended 11-2-22.  Carbo/alimta q3w with radiation therapy (9/19/22 - 11/2/2022)  Completed 4 cycles of Carbo/alimta q3w  "on 12/13/2023  Due to begin Durvalumab at this time.       Chemotherapy  Completed Carboplatin/pemetrexed q3w x4 cycles with concurrent XRT  Due to begin immunotherapy with Durvalumab 1500mg q4w. Orders placed in treatment plan. Will plan to begin therapy in one week (2/14/2023)  Palliative care:   Counseled on smoking cessation, nicotine patches prescribed   c/w magic mouthwash     Hyperkalemia:  resolved     Decrease appetite  nutrition consult  Ramez Vang referral for supplements   Weigh is stable today. Encouraged protein shake supplements    Pain  Right rib   will consult pain management   He reports he did not go. Will resend referral if pain is not controlled with current medication regimen  Pain is currently controlled with pain medication   Hypomagnesia   Resolved     Vision  Appt scheduled with Dr. Felicitas Stephens in [place] tomorrow (2/8/2023) for further evaluation.     Generalized weakness  Overall deconditioning most likely secondary to chemotherapy, radiation, and cancer diagnosis.  Does currently walk with a cane.  Hx of falls.  Currently being followed by HH & PT  Elevated creatinine   Will give 1L NS today. Encouraged hydration.       Plan:   Patient with multiple medical problem that completed 4 cycles of chemotherapy for his lung cancer and is due to start immunotherapy this month.  I have concerns about starting the immunotherapy with severely impaired vision in this patient that is in need of cataract surgery.  I discussed with the patient and his sister possible ocular side effects related to immunotherapy.  Ocular: "Uveitis, iritis, and other ocular inflammatory toxicities can occur. Some cases can be associated with retinal detachment. Various grades of visual impairment to include blindness can occur. If uveitis occurs in combination with other immune-mediated adverse reactions, consider a Vogt-Koyanagi-Harada-like syndrome, as this may require treatment with systemic steroids to reduce " "the risk of permanent vision loss".  The long conversation with the patient and his wife they will like to address cataract surgery prior to initiation of treatment as he is basically legally blind and this surgery may improve his vision and his quality of life.  I discussed case with Dr. Stephens that kindly took my call she may be able to do the cataract surgery Wednesday the 22nd.  She stated that he take about 2 weeks to recover then she can do the 2nd and then start immunotherapy 2 weeks after the 2nd cataract surgery.  Other option will be wait for the 2nd surgery until after    RTC 3 weeks r earlier if needed with labs  Keep appt with Dr Stephens  Will hold Imfinzi this week  The patient was seen, interviewed and examined. Pertinent lab and radiology studies were reviewed.   The patient was given ample opportunity to ask questions, and to the best of my abilities, all questions answered to satisfaction; patient demonstrated understanding of what we discussed and agreeable to the plan. Pt instructed to call should develop concerning signs/symptoms or have further questions.   Portions of the record may have been created with voice recognition software. Occasional wrong-word or sound-a-like substitutions may have occurred due to the inherent limitations of voice recognition software. Read the chart carefully and recognize, using context, where substitutions have occurred.      Snow Kam MD  Hematology/Oncology                              " Jose Blount- spouse

## 2023-02-14 PROBLEM — Z29.89 IMMUNOTHERAPY ENCOUNTER: Status: ACTIVE | Noted: 2022-01-01

## 2023-02-17 NOTE — PROGRESS NOTES
Patient referral was sent to [facility]. When they call patient, he informed them that he would be doing it with [facility].

## 2023-03-13 NOTE — PROGRESS NOTES
Subjective:     Diagnosis:  NSCLC / Adenocarcinoma     Current Treatment:   Durvalumab 1500mg q4w  C1 to begin 2/14/2023    Treatment History:   Carbo/Alimta q3w with radiation therapy (9/19/22 - 11/2/2022)  Completed 4 cycles of Carbo/Alimta q3w on 12/13/2023      Chief Complaint: Lung Cancer (F/U w/ Labs. Pt on Prednisone unknown catherine to L eye once per day. Multiple wounds noted about an inch in diameter to chest, arms, face. Pt c/o wounds itching, and notes that he does pick at them. Pt reports fatigue and generalized pain. SOB noted with O2 @ 89%. )      HPI    Tate Gautam  62 y.o.  male with past medical history significant for COPD on home oxygen, CAD s/p CABG, arthritis, IDDM and diabetic neuropathy / retinopathy referred for management of NSCLC/ADC.    He was followed in the [facility] for an abnormal CT of the chest. He was initially seen in October of 2021 & CT chest showed a new, 10 mm nodule & a chronic 4 mm nodule in the RML. Due to his overall poor health follow-up was suggested however patient missed follow-up appointments. He was then seen in July 2022 and had RML lesion biopsy which increased in size from 1 cm to 2.2 cm. 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     Interval History:    Patient is here today in follow-up with his sister .He is due to begin immunotherapy with Durvalumab.  He denies any CP, headache, nausea, vomiting, diarrhea, or constipation. Patient had left cataract surgery 2/22/23, he is having a minor set back for right cataract surgery and will not be having it until 4/17/23. Patient currently is using Prednisone eye drops in left eye, he is not sure of the dosage. Patient reports wounds on different parts of body including arms, chest, and head. Dr. Lerma has set up wound care once a week to perform dressing changes at his home. He is notably SOB with O2 at 89% while resting.  Poor hygiene.        Past Medical History:   Diagnosis Date    AAA (abdominal  aortic aneurysm)     Anxiety     Chronic obstructive lung disease     Depression     Diabetes mellitus, type 2     GERD (gastroesophageal reflux disease)     Hyperlipidemia     Hypertension     Lung mass     On home O2     2-4 L    Spinal stenosis       Past Surgical History:   Procedure Laterality Date    ANGIOPLASTY      BRAIN SURGERY      Trauma/ Subdural.    CATARACT EXTRACTION EXTRACAPSULAR W/ INTRAOCULAR LENS IMPLANTATION Left     LUNG BIOPSY Right 08/30/2022    Dr. hSaikh    NECK SURGERY      x3    right 2nd toe amputation       Social History     Socioeconomic History    Marital status: Unknown   Tobacco Use    Smoking status: Every Day     Packs/day: 0.25     Years: 50.00     Pack years: 12.50     Types: Cigarettes    Smokeless tobacco: Never    Tobacco comments:     Now smoking 1/2 PPD   Substance and Sexual Activity    Alcohol use: Not Currently    Drug use: Yes     Types: Marijuana     Comment: 1 joint a day    Sexual activity: Not Currently   Social History Narrative    ** Merged History Encounter **           Family History   Problem Relation Age of Onset    Cancer Mother     Heart disease Father       Review of patient's allergies indicates:   Allergen Reactions    Duloxetine Other (See Comments)     unknown      Review of Systems   Constitutional:  Positive for fatigue. Negative for activity change, appetite change, chills, fever and unexpected weight change.   HENT:  Negative for ear discharge, facial swelling, mouth sores, nosebleeds, sinus pressure/congestion and sore throat.         Excessive tearing to bilateral eyes    Eyes:  Positive for visual disturbance (Diabetic retinopathy). Negative for pain and discharge.   Respiratory:  Negative for cough, chest tightness, shortness of breath and wheezing.    Cardiovascular:  Negative for chest pain, palpitations and leg swelling.   Gastrointestinal: Negative.  Negative for abdominal pain, blood in stool, change in bowel habit, constipation, diarrhea,  nausea, vomiting and change in bowel habit.   Endocrine: Negative.    Genitourinary: Negative.  Negative for dysuria, frequency, hematuria and urgency.   Musculoskeletal:  Negative for arthralgias, back pain, gait problem, joint swelling and myalgias.        Right rib pain. Weakness   Integumentary:  Positive for wound. Negative for color change, pallor and rash.   Allergic/Immunologic: Negative.  Negative for frequent infections.   Neurological:  Positive for weakness. Negative for dizziness, vertigo, syncope, light-headedness and numbness.   Hematological: Negative.  Negative for adenopathy. Does not bruise/bleed easily.   Psychiatric/Behavioral: Negative.  Negative for confusion and dysphoric mood. The patient is not nervous/anxious.    All other systems reviewed and are negative.      Objective:        Vitals:    03/14/23 0906   BP: 132/73   Pulse: 67   Resp: 18   Temp: 98.2 °F (36.8 °C)               Physical Exam  Vitals reviewed.   Constitutional:       General: He is not in acute distress.     Appearance: He is ill-appearing.          Comments: Looks weak and deconditioned   HENT:      Head: Normocephalic and atraumatic.      Right Ear: External ear normal.      Left Ear: External ear normal.      Nose: Nose normal.      Mouth/Throat:      Mouth: Mucous membranes are moist.      Pharynx: Oropharynx is clear. No oropharyngeal exudate or posterior oropharyngeal erythema.   Eyes:      Extraocular Movements: Extraocular movements intact.      Conjunctiva/sclera: Conjunctivae normal.      Pupils: Pupils are equal, round, and reactive to light.      Comments: Excessive tearing to bilateral eyes    Cardiovascular:      Rate and Rhythm: Normal rate and regular rhythm.      Pulses: Normal pulses.      Heart sounds: Normal heart sounds. No murmur heard.    No friction rub. No gallop.   Pulmonary:      Effort: Pulmonary effort is normal. No respiratory distress.      Breath sounds: Decreased breath sounds present. No  rales.   Chest:       Abdominal:      General: Abdomen is flat. Bowel sounds are normal. There is no distension.      Palpations: Abdomen is soft. There is no mass.      Tenderness: There is no abdominal tenderness. There is no guarding.   Musculoskeletal:         General: No swelling, tenderness or deformity. Normal range of motion.      Cervical back: Normal range of motion and neck supple.      Right lower leg: No edema.      Left lower leg: No edema.      Comments: Walks with cane   Lymphadenopathy:      Cervical: No cervical adenopathy.   Skin:     General: Skin is warm and dry.      Findings: Lesion and wound present. No erythema or rash.             Comments: Multiple hyperpigmented lesions/wound all over his body. Some of them ulcerated, including era lobe, haw, bilateral arms and leg back and shoulder   Neurological:      Mental Status: He is oriented to person, place, and time.      Cranial Nerves: Cranial nerves 2-12 are intact.      Motor: Weakness present.      Gait: Gait abnormal (walks with cane).   Psychiatric:         Attention and Perception: He is attentive.         Mood and Affect: Mood normal. Affect is not flat.         Behavior: Behavior normal. Behavior is cooperative.         Thought Content: Thought content normal. Thought content does not include suicidal ideation.         Cognition and Memory: He does not exhibit impaired recent memory.         Judgment: Judgment normal.         ECOG SCORE    1 - Restricted in strenuous activity-ambulatory and able to carry out work of a light nature         LABS AND IMAGING REVIEWED IN EPIC        CT chest 10/4/21: Interim development of 1 cm subpleural nodule lateral right lower lobe. 4 mm nodule lateral right middle lobe    PET 1/5/2022: Right middle lobe nodule demonstrates no significant FDG activity and is stable in size     CT Chest 7/18/2022: Interim increase in size of prior 1 cm right middle lobe subpleural nodule now measuring 2.2 cm.  Interim  increase in size of prior 4 mm subpleural nodule lateral right middle lobe now measuring 6 mm. Pinpoint 2 mm subpleural nodules at the left upper and left lower lung. Partial thickening of the left lateral major fissure with the atelectasis and/or scarring at the left lingular segment.    PET 9/8/22: RML hypermetabolc nodule. 2 smaller lesions with no avid activity with larger measure 0.9 x 0.7 cm. Mild uptake with SUV at 3.0 & measure 1.8 x 1.0 cm in right hilum worrisome for metastatic disease.     Assessment:     1. Adenocarcinoma, lung, right    2. Immunotherapy encounter    3. Tobacco user          NSCLC / Adenocarcinoma Stage IIB or IIIA as can be T1c (2.2cm) or T3 (separate tumor nodules in RML, though not avid) N1 M0   - PD-L1: 85%. Negative for ALK, BRAF, EGFR, ROS1, TrkA/B/C. MSI-S/MMR-P .   - CT chest 10/4/21: 1 cm subpleural RLL nodule. 4 mm lateral RML nodule .   - PET 1/5/2022: RML nodule demonstrates no significant FDG activity and is stable in size   - CT Chest 7/18/2022: Interim increase in size of prior 1 cm RML subpleural nodule now measuring 2.2 cm.  Interim increase in size of prior 4 mm subpleural nodule lateral RML lobe now measuring 6 mm. Pinpoint 2 mm subpleural nodules at the left upper and left lower lung. Partial thickening of the left lateral major fissure with the atelectasis and/or scarring at the left lingular segment.  - IR guided biopsy on 8/30/2022 with pathology showing pulmonary ADC.   - PET 9/8/22: RML hypermetabolc nodule. 2 smaller lesions with no avid activity with larger measure 0.9 x 0.7 cm. Mild uptake with SUV at 3.0 & measure 1.8 x 1.0 cm in right hilum worrisome for metastatic disease.   - MRI Brain 9/7/22: no evidence of metastatic disease. Prior right occipitoparietal craniotomy with localized right temporal lobe encephalomalacia.   - Based on Stage IIB or IIIA with COPD on home oxygen, not a candidate for surgery, but will benefit from C-XRT.   - Started 9/19/22 -  ended 11-2-22.  Carbo/Alimta q3w with radiation therapy (9/19/22 - 11/2/2022)  Completed 4 cycles of Carbo/Alimta q3w on 12/13/2023  Due to begin Durvalumab at this time.       Chemotherapy  Completed Carboplatin/pemetrexed q3w x4 cycles with concurrent XRT  Due to begin immunotherapy with Durvalumab 1500mg q4w. Orders placed in treatment plan. Will plan to begin therapy after right cataract surgery (4/17/23).  Palliative care:   Counseled on smoking cessation, nicotine patches prescribed   c/w magic mouthwash     Hyperkalemia:  resolved     Decrease appetite  nutrition consult  Ramez Vang referral for supplements   Weigh is stable today. Encouraged protein shake supplements    Pain  Right rib   will consult pain management   He reports he did not go. Will resend referral if pain is not controlled with current medication regimen  Pain is currently controlled with pain medication   Hypomagnesia   Resolved     Vision             A. Scheduled to have right cataract surgery April 17, 2023.  Generalized weakness  Overall deconditioning most likely secondary to chemotherapy, radiation, and cancer diagnosis.  Does currently walk with a cane.  Hx of falls.  Currently being followed by HH & PT  Elevated creatinine   Encouraged hydration.   11. Skin Lesions             A. Multiple dark skin lesions on multiple parts of body.                 Will refer to dermatology (Jayden Ballard)     Plan:       -RTC 4 weeks with MD with labs  -Refer to dermatologist--Has an appt with Dr Ballard Monday 4/20/2023--keep appt.  -Will hold Imfinzi this week  -Will order CT CAP to be completed before next visit  -cbc, cmp, mag, phos- 1 hr prior    The patient was seen, interviewed and examined. Pertinent lab and radiology studies were reviewed.   The patient was given ample opportunity to ask questions, and to the best of my abilities, all questions answered to satisfaction; patient demonstrated understanding of what we discussed and agreeable to  the plan. Pt instructed to call should develop concerning signs/symptoms or have further questions.         Snow Kam MD  Hematology/Oncology

## 2023-03-14 NOTE — PROGRESS NOTES
Dr. Jayden Ballrad's office called regarding urgent appointment needed. Patient has an appointment schedule for March 20th at 0900.

## 2023-04-25 NOTE — PROGRESS NOTES
"Subjective:     Diagnosis:  NSCLC / Adenocarcinoma     Current Treatment:   Durvalumab 1500mg q4w  C1 to began 2/14/2023    Treatment History:   Carbo/Alimta q3w with radiation therapy (9/19/22 - 11/2/2022)  Completed 4 cycles of Carbo/Alimta q3w on 12/13/2023      Chief Complaint: No chief complaint on file.      HPI    Tate Gautam  62 y.o.  male with past medical history significant for COPD on home oxygen, CAD s/p CABG, arthritis, IDDM and diabetic neuropathy / retinopathy referred for management of NSCLC/ADC.    He was followed in the [facility] for an abnormal CT of the chest. He was initially seen in October of 2021 & CT chest showed a new, 10 mm nodule & a chronic 4 mm nodule in the RML. Due to his overall poor health follow-up was suggested however patient missed follow-up appointments. He was then seen in July 2022 and had RML lesion biopsy which increased in size from 1 cm to 2.2 cm. IR guided biopsy on 8/30/2022 showed PULMONARY ADENOCARCINOMA, POORLY DIFFERENTIATED.   He was seen by Radiation oncology who noted:  "62-year-old male with an enlarging adenocarcinoma of the right middle lobe and probable right hilar  involvement by PET/CT scan. There is no evidence of mediastinal lymph nodes or metastatic disease. I discussed the  potential benefit and adverse effects of definitive radiotherapy with concurrent chemotherapy in detail with the patient.  All questions were answered to his apparent satisfaction. He has indicated a willingness to proceed with therapy and  appropriate informed consent was obtained."  Plan: He is visiting with his medical oncologist today  He underwent IMRT through Dr. Conrad from 10/2/2022 to 11/1/22 with carbo alimta  He initiated durvalumab  on 2/14/23    Interval History:    4/25/23 Patient has received 4 cycles of durvalumab. Ct shows decrease mass but development of ground glass changes raising question of immunotherapy pneumonitis.     Patient is here today in " follow-up with his sister .He is due to begin immunotherapy with Durvalumab.  He denies any CP, headache, nausea, vomiting, diarrhea, or constipation. Patient had left cataract surgery 2/22/23, he is having a minor set back for right cataract surgery and will not be having it until 4/17/23. Patient currently is using Prednisone eye drops in left eye, he is not sure of the dosage. Patient reports wounds on different parts of body including arms, chest, and head. Dr. Lerma has set up wound care once a week to perform dressing changes at his home. He is notably SOB with O2 at 89% while resting.  Poor hygiene.        Past Medical History:   Diagnosis Date    AAA (abdominal aortic aneurysm)     Anxiety     Chronic obstructive lung disease     Depression     Diabetes mellitus, type 2     GERD (gastroesophageal reflux disease)     Hyperlipidemia     Hypertension     Lung mass     On home O2     2-4 L    Spinal stenosis       Past Surgical History:   Procedure Laterality Date    ANGIOPLASTY      BRAIN SURGERY      Trauma/ Subdural.    CATARACT EXTRACTION EXTRACAPSULAR W/ INTRAOCULAR LENS IMPLANTATION Left     LUNG BIOPSY Right 08/30/2022    Dr. Shaikh    NECK SURGERY      x3    right 2nd toe amputation       Social History     Socioeconomic History    Marital status: Unknown   Tobacco Use    Smoking status: Every Day     Packs/day: 0.25     Years: 50.00     Pack years: 12.50     Types: Cigarettes    Smokeless tobacco: Never    Tobacco comments:     Now smoking 1/2 PPD   Substance and Sexual Activity    Alcohol use: Not Currently    Drug use: Yes     Types: Marijuana     Comment: 1 joint a day    Sexual activity: Not Currently   Social History Narrative    ** Merged History Encounter **           Family History   Problem Relation Age of Onset    Cancer Mother     Heart disease Father       Review of patient's allergies indicates:   Allergen Reactions    Duloxetine Other (See Comments)     unknown      Review of Systems    Constitutional:  Positive for fatigue. Negative for activity change, appetite change, chills, fever and unexpected weight change.   HENT:  Negative for ear discharge, facial swelling, mouth sores, nosebleeds, sinus pressure/congestion and sore throat.         Excessive tearing to bilateral eyes    Eyes:  Positive for visual disturbance (Diabetic retinopathy). Negative for pain and discharge.   Respiratory:  Negative for cough, chest tightness, shortness of breath and wheezing.    Cardiovascular:  Negative for chest pain, palpitations and leg swelling.   Gastrointestinal: Negative.  Negative for abdominal pain, blood in stool, change in bowel habit, constipation, diarrhea, nausea, vomiting and change in bowel habit.   Endocrine: Negative.    Genitourinary: Negative.  Negative for dysuria, frequency, hematuria and urgency.   Musculoskeletal:  Negative for arthralgias, back pain, gait problem, joint swelling and myalgias.        Right rib pain. Weakness   Integumentary:  Positive for wound. Negative for color change, pallor and rash.   Allergic/Immunologic: Negative.  Negative for frequent infections.   Neurological:  Positive for weakness. Negative for dizziness, vertigo, syncope, light-headedness and numbness.   Hematological: Negative.  Negative for adenopathy. Does not bruise/bleed easily.   Psychiatric/Behavioral: Negative.  Negative for confusion and dysphoric mood. The patient is not nervous/anxious.    All other systems reviewed and are negative.      Objective:        There were no vitals filed for this visit.              Physical Exam  Vitals reviewed.   Constitutional:       General: He is not in acute distress.     Appearance: He is ill-appearing.          Comments: Looks weak and deconditioned   HENT:      Head: Normocephalic and atraumatic.      Right Ear: External ear normal.      Left Ear: External ear normal.      Nose: Nose normal.      Mouth/Throat:      Mouth: Mucous membranes are moist.       Pharynx: Oropharynx is clear. No oropharyngeal exudate or posterior oropharyngeal erythema.   Eyes:      Extraocular Movements: Extraocular movements intact.      Conjunctiva/sclera: Conjunctivae normal.      Pupils: Pupils are equal, round, and reactive to light.      Comments: Excessive tearing to bilateral eyes    Cardiovascular:      Rate and Rhythm: Normal rate and regular rhythm.      Pulses: Normal pulses.      Heart sounds: Normal heart sounds. No murmur heard.    No friction rub. No gallop.   Pulmonary:      Effort: Pulmonary effort is normal. No respiratory distress.      Breath sounds: Decreased breath sounds present. No rales.   Chest:       Abdominal:      General: Abdomen is flat. Bowel sounds are normal. There is no distension.      Palpations: Abdomen is soft. There is no mass.      Tenderness: There is no abdominal tenderness. There is no guarding.   Musculoskeletal:         General: No swelling, tenderness or deformity. Normal range of motion.      Cervical back: Normal range of motion and neck supple.      Right lower leg: No edema.      Left lower leg: No edema.      Comments: Walks with cane   Lymphadenopathy:      Cervical: No cervical adenopathy.   Skin:     General: Skin is warm and dry.      Findings: Lesion and wound present. No erythema or rash.             Comments: Multiple hyperpigmented lesions/wound all over his body. Some of them ulcerated, including era lobe, haw, bilateral arms and leg back and shoulder   Neurological:      Mental Status: He is oriented to person, place, and time.      Cranial Nerves: Cranial nerves 2-12 are intact.      Motor: Weakness present.      Gait: Gait abnormal (walks with cane).   Psychiatric:         Attention and Perception: He is attentive.         Mood and Affect: Mood normal. Affect is not flat.         Behavior: Behavior normal. Behavior is cooperative.         Thought Content: Thought content normal. Thought content does not include suicidal  ideation.         Cognition and Memory: He does not exhibit impaired recent memory.         Judgment: Judgment normal.         ECOG SCORE             LABS AND IMAGING REVIEWED IN EPIC        CT chest 10/4/21: Interim development of 1 cm subpleural nodule lateral right lower lobe. 4 mm nodule lateral right middle lobe    PET 1/5/2022: Right middle lobe nodule demonstrates no significant FDG activity and is stable in size     CT Chest 7/18/2022: Interim increase in size of prior 1 cm right middle lobe subpleural nodule now measuring 2.2 cm.  Interim increase in size of prior 4 mm subpleural nodule lateral right middle lobe now measuring 6 mm. Pinpoint 2 mm subpleural nodules at the left upper and left lower lung. Partial thickening of the left lateral major fissure with the atelectasis and/or scarring at the left lingular segment.    PET 9/8/22: RML hypermetabolc nodule. 2 smaller lesions with no avid activity with larger measure 0.9 x 0.7 cm. Mild uptake with SUV at 3.0 & measure 1.8 x 1.0 cm in right hilum worrisome for metastatic disease.     Assessment:     No diagnosis found.        NSCLC / Adenocarcinoma Stage IIB or IIIA as can be T1c (2.2cm) or T3 (separate tumor nodules in RML, though not avid) N1 M0   - PD-L1: 85%. Negative for ALK, BRAF, EGFR, ROS1, TrkA/B/C. MSI-S/MMR-P .   - CT chest 10/4/21: 1 cm subpleural RLL nodule. 4 mm lateral RML nodule .   - PET 1/5/2022: RML nodule demonstrates no significant FDG activity and is stable in size   - CT Chest 7/18/2022: Interim increase in size of prior 1 cm RML subpleural nodule now measuring 2.2 cm.  Interim increase in size of prior 4 mm subpleural nodule lateral RML lobe now measuring 6 mm. Pinpoint 2 mm subpleural nodules at the left upper and left lower lung. Partial thickening of the left lateral major fissure with the atelectasis and/or scarring at the left lingular segment.  - IR guided biopsy on 8/30/2022 with pathology showing pulmonary ADC.   - PET  9/8/22: RML hypermetabolc nodule. 2 smaller lesions with no avid activity with larger measure 0.9 x 0.7 cm. Mild uptake with SUV at 3.0 & measure 1.8 x 1.0 cm in right hilum worrisome for metastatic disease.   - MRI Brain 9/7/22: no evidence of metastatic disease. Prior right occipitoparietal craniotomy with localized right temporal lobe encephalomalacia.   - Based on Stage IIB or IIIA with COPD on home oxygen, not a candidate for surgery, but will benefit from C-XRT.   - XRT to right Lung/Hilum Started 9/19/22 - ended 11-2-22.  Carbo/Alimta q3w with radiation therapy (9/19/22 - 11/2/2022)  Completed 4 cycles of Carbo/Alimta q3w on 12/13/2023  Due to begin Durvalumab in 2/2023 but needed cataract surgery first. Now ill begin on 4/26/23  J.  4/20/23 CT CAP: LUNGS/PLEURA: Right middle lobe nodule is partially obscured by surrounding ground-glass opacities.  Nodule estimated to measure 2.1 x 2 cm (3, 74); previously 2.5 x 2.2 cm at a similar location.  There are new ground-glass opacities in the right middle lobe, basilar right upper lobe and superior segment of the right lower lobe.  Scattered tiny pulmonary nodules at the mid to basilar right lung are indeterminate.  For example there is a 5-6 mm nodule in the right lower lobe (3, 68) which is new compared to previous exam.  No pleural effusion or pneumothorax.      Chemotherapy  Completed Carboplatin/pemetrexed q3w x4 cycles with concurrent XRT to right lung and hilum  Due to begin immunotherapy with Durvalumab 1500mg q4w. 4/26/23 now t hat his cataract surgery is complete.   Palliative care:   Counseled on smoking cessation, nicotine patches prescribed   c/w magic mouthwash     Hyperkalemia:  resolved     Decrease appetite  nutrition consult  Ramez Vang referral for supplements   Weigh is stable today. Encouraged protein shake supplements    Pain  Right rib   will consult pain management   He reports he did not go. Will resend referral if pain is not controlled  with current medication regimen  Pain is currently controlled with pain medication   Hypomagnesia   Resolved     Vision             A. Scheduled to have right cataract surgery April 17, 2023.  Generalized weakness  Overall deconditioning most likely secondary to chemotherapy, radiation, and cancer diagnosis.  Does currently walk with a cane.  Hx of falls.  Currently being followed by HH & PT  Elevated creatinine   Encouraged hydration.   11. Skin Lesions             A. Multiple dark skin lesions on multiple parts of body.                 Will refer to dermatology (Jayden Ballard)     Plan:       -RTC tomorro 4/26/23 for C1D1 durvalumab, then, 4 weeks with MD with labs    -cbc, TSH cmp, mag, phos- 1 hr prior    The patient was seen, interviewed and examined. Pertinent lab and radiology studies were reviewed.   The patient was given ample opportunity to ask questions, and to the best of my abilities, all questions answered to satisfaction; patient demonstrated understanding of what we discussed and agreeable to the plan. Pt instructed to call should develop concerning signs/symptoms or have further questions.         Tehsa Arevalo MD  Hematology/Oncology

## 2023-05-24 NOTE — PROGRESS NOTES
"Subjective:     Diagnosis:  NSCLC / Adenocarcinoma     Current Treatment:   Durvalumab 1500mg q4w  C1 to began 2/14/2023    Treatment History:   Carbo/Alimta q3w with radiation therapy (9/19/22 - 11/2/2022)  Completed 4 cycles of Carbo/Alimta q3w on 12/13/2023      Chief Complaint: Lung Cancer (Pt reports feeling weak and dizzy since before last visit )      HPI    Tate Gautam  63 y.o.  male with past medical history significant for COPD on home oxygen, CAD s/p CABG, arthritis, IDDM and diabetic neuropathy / retinopathy referred for management of NSCLC/ADC.    He was followed in the [facility] for an abnormal CT of the chest. He was initially seen in October of 2021 & CT chest showed a new, 10 mm nodule & a chronic 4 mm nodule in the RML. Due to his overall poor health follow-up was suggested however patient missed follow-up appointments. He was then seen in July 2022 and had RML lesion biopsy which increased in size from 1 cm to 2.2 cm. IR guided biopsy on 8/30/2022 showed PULMONARY ADENOCARCINOMA, POORLY DIFFERENTIATED.   He was seen by Radiation oncology who noted:  "62-year-old male with an enlarging adenocarcinoma of the right middle lobe and probable right hilar  involvement by PET/CT scan. There is no evidence of mediastinal lymph nodes or metastatic disease. I discussed the  potential benefit and adverse effects of definitive radiotherapy with concurrent chemotherapy in detail with the patient.  All questions were answered to his apparent satisfaction. He has indicated a willingness to proceed with therapy and  appropriate informed consent was obtained."  Plan: He is visiting with his medical oncologist today  He underwent IMRT through Dr. Conrad from 10/2/2022 to 11/1/22 with carbo alimta  He initiated durvalumab  on 2/14/23    Interval History:    He returns to clinic today for 4 week follow up in for treatment clearance for cycle to [facility].  He feels well today, although he continues to have " dizziness and fatigue that has been present since beginning his treatment.  He is currently followed by Dermatology, tied low, for multiple skin lesions.  He continues to receive home health for wound to his back.  He continues to report a decreased appetite.  He denies any increased shortness of breath, chest pain, nausea, vomiting, diarrhea, constipation, swelling, or recent hospitalizations.  He does wear oxygen at home.            Past Medical History:   Diagnosis Date    AAA (abdominal aortic aneurysm)     Anxiety     Chronic obstructive lung disease     Depression     Diabetes mellitus, type 2     GERD (gastroesophageal reflux disease)     Hyperlipidemia     Hypertension     Lung mass     On home O2     2-4 L    Spinal stenosis       Past Surgical History:   Procedure Laterality Date    ANGIOPLASTY      BRAIN SURGERY      Trauma/ Subdural.    CATARACT EXTRACTION EXTRACAPSULAR W/ INTRAOCULAR LENS IMPLANTATION Left     LUNG BIOPSY Right 08/30/2022    Dr. Shaikh    NECK SURGERY      x3    right 2nd toe amputation       Social History     Socioeconomic History    Marital status: Unknown   Tobacco Use    Smoking status: Every Day     Packs/day: 0.25     Years: 50.00     Pack years: 12.50     Types: Cigarettes    Smokeless tobacco: Never    Tobacco comments:     Now smoking 1/2 PPD   Substance and Sexual Activity    Alcohol use: Not Currently    Drug use: Yes     Types: Marijuana     Comment: 1 joint a day    Sexual activity: Not Currently   Social History Narrative    ** Merged History Encounter **           Family History   Problem Relation Age of Onset    Cancer Mother     Heart disease Father       Review of patient's allergies indicates:   Allergen Reactions    Duloxetine Other (See Comments)     unknown      Review of Systems   Constitutional:  Positive for fatigue. Negative for activity change, appetite change, chills, fever and unexpected weight change.   HENT:  Negative for ear discharge, facial swelling,  mouth sores, nosebleeds, sinus pressure/congestion and sore throat.         Excessive tearing to bilateral eyes    Eyes:  Negative for pain, discharge and visual disturbance.   Respiratory:  Negative for cough, chest tightness, shortness of breath and wheezing.    Cardiovascular:  Negative for chest pain, palpitations and leg swelling.   Gastrointestinal: Negative.  Negative for abdominal pain, blood in stool, change in bowel habit, constipation, diarrhea, nausea, vomiting and change in bowel habit.   Endocrine: Negative.    Genitourinary: Negative.  Negative for dysuria, frequency, hematuria and urgency.   Musculoskeletal:  Negative for arthralgias, back pain, gait problem, joint swelling and myalgias.        Right rib pain. Weakness   Integumentary:  Positive for wound. Negative for color change, pallor and rash.   Allergic/Immunologic: Negative.  Negative for frequent infections.   Neurological:  Positive for weakness. Negative for dizziness, vertigo, syncope, light-headedness and numbness.   Hematological: Negative.  Negative for adenopathy. Does not bruise/bleed easily.   Psychiatric/Behavioral: Negative.  Negative for confusion and dysphoric mood. The patient is not nervous/anxious.    All other systems reviewed and are negative.      Objective:        Vitals:    05/24/23 0953   BP: 131/73   Pulse: 60   Resp: 18   Temp: 97.4 °F (36.3 °C)                 Physical Exam  Vitals reviewed.   Constitutional:       General: He is not in acute distress.     Appearance: He is ill-appearing.          Comments: Looks weak and deconditioned   HENT:      Head: Normocephalic and atraumatic.      Right Ear: External ear normal.      Left Ear: External ear normal.      Nose: Nose normal.      Mouth/Throat:      Mouth: Mucous membranes are moist.      Pharynx: Oropharynx is clear. No oropharyngeal exudate or posterior oropharyngeal erythema.   Eyes:      Extraocular Movements: Extraocular movements intact.       Conjunctiva/sclera: Conjunctivae normal.      Pupils: Pupils are equal, round, and reactive to light.      Comments: Excessive tearing to bilateral eyes    Cardiovascular:      Rate and Rhythm: Normal rate and regular rhythm.      Pulses: Normal pulses.      Heart sounds: Normal heart sounds. No murmur heard.    No friction rub. No gallop.   Pulmonary:      Effort: Pulmonary effort is normal. No respiratory distress.      Breath sounds: Decreased breath sounds present. No rales.   Chest:       Abdominal:      General: Abdomen is flat. Bowel sounds are normal. There is no distension.      Palpations: Abdomen is soft. There is no mass.      Tenderness: There is no abdominal tenderness. There is no guarding.   Musculoskeletal:         General: No swelling, tenderness or deformity. Normal range of motion.      Cervical back: Normal range of motion and neck supple.      Right lower leg: No edema.      Left lower leg: No edema.      Comments: Walks with cane   Lymphadenopathy:      Cervical: No cervical adenopathy.   Skin:     General: Skin is warm and dry.      Findings: Lesion and wound present. No erythema or rash.             Comments: Multiple hyperpigmented lesions/wound all over his body. Some of them ulcerated, including ear lobe, bilateral arms and leg back and shoulder   Neurological:      Mental Status: He is oriented to person, place, and time.      Cranial Nerves: Cranial nerves 2-12 are intact.      Motor: Weakness present.      Gait: Gait abnormal (walks with cane).   Psychiatric:         Attention and Perception: He is attentive.         Mood and Affect: Mood normal. Affect is not flat.         Behavior: Behavior normal. Behavior is cooperative.         Thought Content: Thought content normal. Thought content does not include suicidal ideation.         Cognition and Memory: He does not exhibit impaired recent memory.         Judgment: Judgment normal.         ECOG SCORE    2 - Capable of all selfcare but  unable to carry out any work activities, active > 50% of hours         LABS AND IMAGING REVIEWED IN EPIC        CT chest 10/4/21: Interim development of 1 cm subpleural nodule lateral right lower lobe. 4 mm nodule lateral right middle lobe    PET 1/5/2022: Right middle lobe nodule demonstrates no significant FDG activity and is stable in size     CT Chest 7/18/2022: Interim increase in size of prior 1 cm right middle lobe subpleural nodule now measuring 2.2 cm.  Interim increase in size of prior 4 mm subpleural nodule lateral right middle lobe now measuring 6 mm. Pinpoint 2 mm subpleural nodules at the left upper and left lower lung. Partial thickening of the left lateral major fissure with the atelectasis and/or scarring at the left lingular segment.    PET 9/8/22: RML hypermetabolc nodule. 2 smaller lesions with no avid activity with larger measure 0.9 x 0.7 cm. Mild uptake with SUV at 3.0 & measure 1.8 x 1.0 cm in right hilum worrisome for metastatic disease.     Assessment:     1. 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 Immunotherapy encounter    4. Hyperkalemia    5. Hypomagnesemia    6. Tobacco user            NSCLC / Adenocarcinoma Stage IIB or IIIA as can be T1c (2.2cm) or T3 (separate tumor nodules in RML, though not avid) N1 M0   - PD-L1: 85%. Negative for ALK, BRAF, EGFR, ROS1, TrkA/B/C. MSI-S/MMR-P .   - CT chest 10/4/21: 1 cm subpleural RLL nodule. 4 mm lateral RML nodule .   - PET 1/5/2022: RML nodule demonstrates no significant FDG activity and is stable in size   - CT Chest 7/18/2022: Interim increase in size of prior 1 cm RML subpleural nodule now measuring 2.2 cm.  Interim increase in size of prior 4 mm subpleural nodule lateral RML lobe now measuring 6 mm. Pinpoint 2 mm subpleural nodules at the left upper and left lower lung. Partial thickening of the left lateral major fissure with the atelectasis and/or scarring at the left lingular segment.  - IR guided  biopsy on 8/30/2022 with pathology showing pulmonary ADC.   - PET 9/8/22: RML hypermetabolc nodule. 2 smaller lesions with no avid activity with larger measure 0.9 x 0.7 cm. Mild uptake with SUV at 3.0 & measure 1.8 x 1.0 cm in right hilum worrisome for metastatic disease.   - MRI Brain 9/7/22: no evidence of metastatic disease. Prior right occipitoparietal craniotomy with localized right temporal lobe encephalomalacia.   - Based on Stage IIB or IIIA with COPD on home oxygen, not a candidate for surgery, but will benefit from C-XRT.   - XRT to right Lung/Hilum Started 9/19/22 - ended 11-2-22.  Carbo/Alimta q3w with radiation therapy (9/19/22 - 11/2/2022)  Completed 4 cycles of Carbo/Alimta q3w on 12/13/2023  Due to begin Durvalumab in 2/2023 but needed cataract surgery first. Now ill begin on 4/26/23 4/20/23 CT CAP: LUNGS/PLEURA: Right middle lobe nodule is partially obscured by surrounding ground-glass opacities.  Nodule estimated to measure 2.1 x 2 cm (3, 74); previously 2.5 x 2.2 cm at a similar location.  There are new ground-glass opacities in the right middle lobe, basilar right upper lobe and superior segment of the right lower lobe.  Scattered tiny pulmonary nodules at the mid to basilar right lung are indeterminate.  For example there is a 5-6 mm nodule in the right lower lobe (3, 68) which is new compared to previous exam.  No pleural effusion or pneumothorax.      Chemotherapy  Completed Carboplatin/pemetrexed q3w x4 cycles with concurrent XRT to right lung and hilum  Immunotherapy with Durvalumab 1500mg q4w started on 4/26/23 now that his cataract surgery is complete.  Due today for cycle 2 Durvalumab 1500mg q4w.  Labs are stable and adequate to continue with treatment today.      Imaging  Last CT completed 04/20/2023.  We will plan for repeat in 3 months.    Palliative care:   Counseled on smoking cessation, nicotine patches prescribed   c/w magic mouthwash     Hyperkalemia:  resolved     Decrease  appetite  nutrition consult  Ramez Vang referral for supplements   Continues to lose weight.  He does not like nutritional shakes.  He was encouraged to eat small frequent meals we will continue to monitor at this point.    Pain  Right rib   will consult pain management   He reports he did not go. Will resend referral if pain is not controlled with current medication regimen  Pain is currently controlled with pain medication   Hypomagnesia   Resolved     Vision  Improved following bilateral cataract surgery.    Generalized weakness  Overall deconditioning most likely secondary to chemotherapy, radiation, and cancer diagnosis.  Does currently walk with a cane.  Hx of falls.  Currently being followed by HH.  He was instructed to ask HH for PT evaluation.  We will send a referral if needed.    Elevated creatinine   Encouraged hydration. Stable today.    Skin Lesions  Multiple dark skin lesions on multiple parts of body.  Followed by DermatologyJayden    Plan:   Labs stable.    Continue with cycle 2 durvalumab.    Follow up with home health for possible physical therapy.    Monitor weight closely.    Continue follow up with Dermatology.    Return to clinic in 4 weeks with NP for follow up/lab/treatment.      The patient was seen, interviewed and examined. Pertinent lab and radiology studies were reviewed.   The patient was given ample opportunity to ask questions, and to the best of my abilities, all questions answered to satisfaction; patient demonstrated understanding of what we discussed and agreeable to the plan. Pt instructed to call should develop concerning signs/symptoms or have further questions.         Pia Mccoy, REEMA-C  Oncology/Hematology   [facility]

## 2023-06-22 NOTE — TELEPHONE ENCOUNTER
----- Message from Yesi Hopper sent at 6/22/2023  9:30 AM CDT -----  Regarding: CT Scan  CT CAP scheduled 06/28/23 @ [facility] w/ 9:15 arrival    Patient asked to call his sisterEstee with appt -- unable to reach pt's sister -- left a detailed v/m    ----- Message -----  From: Esme Cunningham NP  Sent: 6/21/2023   2:09 PM CDT  To: Yesi Hopper    Yes ASAP, he is having problems. Weight loss, shortness of breath, weak  ----- Message -----  From: Yesi Hopper  Sent: 6/21/2023   2:01 PM CDT  To: Esme Cunningham NP    Is this needing to be scheduled before 07/19/23 beings his last CT CAP was 04/20/23?    Please advise  ----- Message -----  From: Esme Cunningham NP  Sent: 6/21/2023  12:32 PM CDT  To: Yesi Hopper    Please see CT CAP orders

## 2023-06-22 NOTE — PROGRESS NOTES
"Subjective:     Diagnosis:  NSCLC / Adenocarcinoma     Current Treatment:   Durvalumab 1500mg q4w  C1 to began 2/14/2023    Treatment History:   Carbo/Alimta q3w with radiation therapy (9/19/22 - 11/2/2022)  Completed 4 cycles of Carbo/Alimta q3w on 12/13/2023      Chief Complaint: Lung Cancer (Pt reports dizziness, lack of appetite and weakness)      HPI    Tate Gautam  63 y.o.  male with past medical history significant for COPD on home oxygen, CAD s/p CABG, arthritis, IDDM and diabetic neuropathy / retinopathy referred for management of NSCLC/ADC.    He was followed in the [facility] for an abnormal CT of the chest. He was initially seen in October of 2021 & CT chest showed a new, 10 mm nodule & a chronic 4 mm nodule in the RML. Due to his overall poor health follow-up was suggested however patient missed follow-up appointments. He was then seen in July 2022 and had RML lesion biopsy which increased in size from 1 cm to 2.2 cm. IR guided biopsy on 8/30/2022 showed PULMONARY ADENOCARCINOMA, POORLY DIFFERENTIATED.   He was seen by Radiation oncology who noted:  "62-year-old male with an enlarging adenocarcinoma of the right middle lobe and probable right hilar  involvement by PET/CT scan. There is no evidence of mediastinal lymph nodes or metastatic disease. I discussed the  potential benefit and adverse effects of definitive radiotherapy with concurrent chemotherapy in detail with the patient.  All questions were answered to his apparent satisfaction. He has indicated a willingness to proceed with therapy and  appropriate informed consent was obtained."  Plan: He is visiting with his medical oncologist today  He underwent IMRT through Dr. Conrad from 10/2/2022 to 11/1/22 with carbo alimta    He was due to begin Durvalumab in 2/2023 but needed cataract surgery first.  So started on 4/26/23.    Interval History:      6/22/24: Mr. Gautam presents today for follow-up and cycle 3 of Durvalumab with his " sister. He reports feeling weak. He notes increasing shortness of breath. He has not been eating or drinking much, and has lost about 8lbs.  He also notes chronic neck pain which is 8/10. He continues with multiple skin lesion for which his dermatologist has placed him on doxycycline for 90 days along with steroid cream. He endorses dizziness and admits he takes his blood pressure medicine without monitoring his BP prior. He also admits to elevated blood sugars as high as 500 while at home. His DM and BP is manage by his PCP whom he will see today. He does wear oxygen at home. He denies fevers and chills. He does have chronic productive cough.     5/24/23: He returns to clinic today for 4 week follow up in for treatment clearance for cycle to [facility].  He feels well today, although he continues to have dizziness and fatigue that has been present since beginning his treatment.  He is currently followed by Dermatology, tied low, for multiple skin lesions.  He continues to receive home health for wound to his back.  He continues to report a decreased appetite.  He denies any increased shortness of breath, chest pain, nausea, vomiting, diarrhea, constipation, swelling, or recent hospitalizations.  He does wear oxygen at home.            Past Medical History:   Diagnosis Date    AAA (abdominal aortic aneurysm)     Anxiety     Chronic obstructive lung disease     Depression     Diabetes mellitus, type 2     GERD (gastroesophageal reflux disease)     Hyperlipidemia     Hypertension     Lung mass     On home O2     2-4 L    Spinal stenosis       Past Surgical History:   Procedure Laterality Date    ANGIOPLASTY      BRAIN SURGERY      Trauma/ Subdural.    CATARACT EXTRACTION EXTRACAPSULAR W/ INTRAOCULAR LENS IMPLANTATION Left     LUNG BIOPSY Right 08/30/2022    Dr. Shaikh    NECK SURGERY      x3    right 2nd toe amputation       Social History     Socioeconomic History    Marital status: Unknown   Tobacco Use    Smoking  status: Every Day     Packs/day: 0.25     Years: 50.00     Pack years: 12.50     Types: Cigarettes    Smokeless tobacco: Never    Tobacco comments:     Now smoking 1/2 PPD   Substance and Sexual Activity    Alcohol use: Not Currently    Drug use: Yes     Types: Marijuana     Comment: 1 joint a day    Sexual activity: Not Currently   Social History Narrative    ** Merged History Encounter **           Family History   Problem Relation Age of Onset    Cancer Mother     Heart disease Father       Review of patient's allergies indicates:   Allergen Reactions    Duloxetine Other (See Comments)     unknown      Review of Systems   Constitutional:  Positive for activity change, appetite change, fatigue and unexpected weight change. Negative for chills, diaphoresis and fever.   HENT:  Positive for postnasal drip. Negative for ear discharge, facial swelling, mouth sores, nosebleeds, sinus pressure/congestion and sore throat.         Excessive tearing to bilateral eyes    Eyes:  Negative for pain, discharge and visual disturbance.   Respiratory:  Positive for cough and shortness of breath. Negative for chest tightness and wheezing.    Cardiovascular:  Negative for chest pain, palpitations and leg swelling.   Gastrointestinal: Negative.  Negative for abdominal pain, blood in stool, change in bowel habit, constipation, diarrhea, nausea, vomiting and change in bowel habit.   Endocrine: Negative.    Genitourinary: Negative.  Negative for dysuria, frequency and urgency.   Musculoskeletal:  Positive for neck pain. Negative for arthralgias, back pain, gait problem, joint swelling and myalgias.        Right rib pain. Weakness   Integumentary:  Positive for wound. Negative for color change, pallor and rash.   Allergic/Immunologic: Negative.  Negative for frequent infections.   Neurological:  Positive for weakness. Negative for dizziness, vertigo, syncope, light-headedness and numbness.   Hematological: Negative.  Negative for  adenopathy. Does not bruise/bleed easily.   Psychiatric/Behavioral: Negative.  Negative for confusion and dysphoric mood. The patient is not nervous/anxious.    All other systems reviewed and are negative.      Objective:        Vitals:    06/21/23 1033   BP: 104/67   Pulse: 74   Resp: 18   Temp: 97.8 °F (36.6 °C)                 Physical Exam  Vitals reviewed.   Constitutional:       General: He is not in acute distress.     Appearance: He is ill-appearing.          Comments: Looks weak and deconditioned   HENT:      Head: Normocephalic and atraumatic.      Right Ear: External ear normal.      Left Ear: External ear normal.      Nose: Nose normal.      Mouth/Throat:      Mouth: Mucous membranes are moist.      Pharynx: Oropharynx is clear. No oropharyngeal exudate or posterior oropharyngeal erythema.   Eyes:      Extraocular Movements: Extraocular movements intact.      Conjunctiva/sclera: Conjunctivae normal.      Pupils: Pupils are equal, round, and reactive to light.      Comments: Excessive tearing to bilateral eyes    Cardiovascular:      Rate and Rhythm: Normal rate and regular rhythm.      Pulses: Normal pulses.      Heart sounds: Normal heart sounds. No murmur heard.    No friction rub. No gallop.   Pulmonary:      Effort: Pulmonary effort is normal. No respiratory distress.      Breath sounds: Decreased breath sounds present. No rales.   Chest:       Abdominal:      General: Abdomen is flat. Bowel sounds are normal. There is no distension.      Palpations: Abdomen is soft. There is no mass.      Tenderness: There is no abdominal tenderness. There is no guarding.   Musculoskeletal:         General: No swelling, tenderness or deformity. Normal range of motion.      Cervical back: Normal range of motion and neck supple.      Right lower leg: No edema.      Left lower leg: No edema.      Comments: Walks with cane   Lymphadenopathy:      Cervical: No cervical adenopathy.   Skin:     General: Skin is warm and  dry.      Findings: Lesion and wound present. No erythema or rash.             Comments: Multiple hyperpigmented lesions/wound all over his body. Some of them ulcerated, including ear lobe, bilateral arms and leg back and shoulder   Neurological:      Mental Status: He is oriented to person, place, and time.      Cranial Nerves: Cranial nerves 2-12 are intact.      Motor: Weakness present.      Gait: Gait abnormal (walks with cane).   Psychiatric:         Attention and Perception: He is attentive.         Mood and Affect: Mood normal. Affect is not flat.         Behavior: Behavior normal. Behavior is cooperative.         Thought Content: Thought content normal. Thought content does not include suicidal ideation.         Cognition and Memory: He does not exhibit impaired recent memory.         Judgment: Judgment normal.         ECOG SCORE             LABS AND IMAGING REVIEWED IN EPIC        CT chest 10/4/21: Interim development of 1 cm subpleural nodule lateral right lower lobe. 4 mm nodule lateral right middle lobe    PET 1/5/2022: Right middle lobe nodule demonstrates no significant FDG activity and is stable in size     CT Chest 7/18/2022: Interim increase in size of prior 1 cm right middle lobe subpleural nodule now measuring 2.2 cm.  Interim increase in size of prior 4 mm subpleural nodule lateral right middle lobe now measuring 6 mm. Pinpoint 2 mm subpleural nodules at the left upper and left lower lung. Partial thickening of the left lateral major fissure with the atelectasis and/or scarring at the left lingular segment.    PET 9/8/22: RML hypermetabolc nodule. 2 smaller lesions with no avid activity with larger measure 0.9 x 0.7 cm. Mild uptake with SUV at 3.0 & measure 1.8 x 1.0 cm in right hilum worrisome for metastatic disease.     Assessment:     1. 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 Vitamin D deficiency    4. Pneumonia due to infectious organism, unspecified  laterality, unspecified part of lung            NSCLC / Adenocarcinoma Stage IIB or IIIA as can be T1c (2.2cm) or T3 (separate tumor nodules in RML, though not avid) N1 M0   - PD-L1: 85%. Negative for ALK, BRAF, EGFR, ROS1, TrkA/B/C. MSI-S/MMR-P .   - CT chest 10/4/21: 1 cm subpleural RLL nodule. 4 mm lateral RML nodule .   - PET 1/5/2022: RML nodule demonstrates no significant FDG activity and is stable in size   - CT Chest 7/18/2022: Interim increase in size of prior 1 cm RML subpleural nodule now measuring 2.2 cm.  Interim increase in size of prior 4 mm subpleural nodule lateral RML lobe now measuring 6 mm. Pinpoint 2 mm subpleural nodules at the left upper and left lower lung. Partial thickening of the left lateral major fissure with the atelectasis and/or scarring at the left lingular segment.  - IR guided biopsy on 8/30/2022 with pathology showing pulmonary ADC.   - PET 9/8/22: RML hypermetabolc nodule. 2 smaller lesions with no avid activity with larger measure 0.9 x 0.7 cm. Mild uptake with SUV at 3.0 & measure 1.8 x 1.0 cm in right hilum worrisome for metastatic disease.   - MRI Brain 9/7/22: no evidence of metastatic disease. Prior right occipitoparietal craniotomy with localized right temporal lobe encephalomalacia.   - Based on Stage IIB or IIIA with COPD on home oxygen, not a candidate for surgery, but will benefit from C-XRT.   - XRT to right Lung/Hilum Started 9/19/22 - ended 11-2-22.  Carbo/Alimta q3w with radiation therapy (9/19/22 - 11/2/2022)  Completed 4 cycles of Carbo/Alimta q3w on 12/13/2023  Was due to begin Durvalumab in 2/2023 but needed cataract surgery first. Alternatively started 4/26/23 4/20/23 CT CAP: LUNGS/PLEURA: Right middle lobe nodule is partially obscured by surrounding ground-glass opacities.  Nodule estimated to measure 2.1 x 2 cm (3, 74); previously 2.5 x 2.2 cm at a similar location.  There are new ground-glass opacities in the right middle lobe, basilar right upper lobe and  superior segment of the right lower lobe.  Scattered tiny pulmonary nodules at the mid to basilar right lung are indeterminate.  For example there is a 5-6 mm nodule in the right lower lobe (3, 68) which is new compared to previous exam.  No pleural effusion or pneumothorax.      Chemotherapy  Completed Carboplatin/pemetrexed q3w x4 cycles with concurrent XRT to right lung and hilum  Immunotherapy with Durvalumab 1500mg q4w started on 4/26/23 now that his cataract surgery is complete.  Due today for cycle 3 Durvalumab 1500mg q4w. Due to the patient's weakness, sob and cough we will hold his treatment today.  Imaging  Last CT completed 04/20/2023.  Due to his weight loss, increasing SOB and weakness, we will obtain a restaging CT asap.     Palliative care:   Counseled on smoking cessation, nicotine patches prescribed   c/w magic mouthwash     Hyperkalemia:  resolved     Decrease appetite  nutrition consult  Ramez Vang referral for supplements   Continues to lose weight.  He does not like nutritional shakes.  He was encouraged to eat small frequent meals we will continue to monitor at this point.  Prescribed megace today.    Pain  Right rib   Pain is currently controlled with pain medication   Hypomagnesia   Resolved     Vision  Improved following bilateral cataract surgery.    Generalized weakness  Overall deconditioning most likely secondary to chemotherapy, radiation, and cancer diagnosis.  Does currently walk with a cane.  Hx of falls.  Currently being followed by HH.  He was instructed to ask HH for PT evaluation.  We will send a referral if needed.    Elevated creatinine/Dehydration  Encouraged hydration.   We will give 1 L of NS today.     Skin Lesions  Multiple dark skin lesions on multiple parts of body.  Followed by Dermatology, Jayden Ballard.  Continue Doxycycline x 90 days  SOB/Cough         -CXR obtained today indicates pneumonia as per radiologist. We will start him on Amoxicillin. He was instructed to  continue doxycycline.    14.  Vitamin D deficiency               Will start him on 1000IUs daily and 50,000 IU once weekly.     15. Dizziness : Informed patient to discuss with PCP regarding BP med's        Plan:   Hold  cycle 3 of durvalumab.    Obtain CXR today.  Ordered restaging CT CAP ASAP.  Start Megace   Give 1 L NS today.   Follow up with home health for possible physical therapy.    Continue follow up with Dermatology.    Return to clinic in 1 week with MD for follow up and restaging imaging review       Addendum: Radiologist called and indicates CXR is suspicious of pneumonia. Amoxicillin started in addition to doxycycline. Patient and his caregiver informed.

## 2023-07-17 NOTE — PROGRESS NOTES
"HEMATOLOGY/ONCOLOGY OFFICE CLINIC VISIT  JES Walden      Subjective:     Diagnosis:  NSCLC / Adenocarcinoma   Pulmonary nodules-bilateral    Current Treatment:   Durvalumab 1500mg q4w  C1 to began 2/14/2023    Treatment History:   Carbo/Alimta q3w with radiation therapy (9/19/22 - 11/2/2022)  Completed 4 cycles of Carbo/Alimta q3w on 12/13/2023      Chief Complaint: Lung Cancer (Pt reports dizziness, pain everywhere, sob and coughing. He states he overall feels worst.)      HPI    Tate Gautam  63 y.o.  male with past medical history significant for COPD on home oxygen, CAD s/p CABG, arthritis, IDDM and diabetic neuropathy / retinopathy referred for management of NSCLC/ADC.    He was followed in the [facility] for an abnormal CT of the chest. He was initially seen in October of 2021 & CT chest showed a new, 10 mm nodule & a chronic 4 mm nodule in the RML. Due to his overall poor health follow-up was suggested however patient missed follow-up appointments. He was then seen in July 2022 and had RML lesion biopsy which increased in size from 1 cm to 2.2 cm. IR guided biopsy on 8/30/2022 showed PULMONARY ADENOCARCINOMA, POORLY DIFFERENTIATED.   He was seen by Radiation oncology who noted:  "62-year-old male with an enlarging adenocarcinoma of the right middle lobe and probable right hilar involvement by PET/CT scan. There is no evidence of mediastinal lymph nodes or metastatic disease. I discussed the  potential benefit and adverse effects of definitive radiotherapy with concurrent chemotherapy in detail with the patient.  All questions were answered to his apparent satisfaction. He has indicated a willingness to proceed with therapy and  appropriate informed consent was obtained."    He underwent IMRT through Dr. Conrad from 10/2/2022 to 11/1/22 with carbo alimta    He was due to begin Durvalumab in 2/2023 but needed cataract surgery first.  So started on 4/26/23.    Interval History:      7/18/23: Patient " presents today for F/U, clearance for treatment, and scan review. Cycle 3 of Imfinzi was held last appointment due to his CXR showing signs of pneumonia. Pt still on Doxycycline. Patient did a CT CAP on 6/28/23.  It shows multiple bilateral lung nodules.  This most likely progression of his disease and now metastatic lung cancer.  He will F/U with Dr. Ballard in one month, appointment made. He does state that he feels a gradual worsening in his condition. He feel SOB, generalized pain all over. Explained to patient and his that his scans have new multiple tiny nodules. Chemotherapy recommended. Will give patient time to think about this decision. Patient and sister verbalized understanding.      Patient skin with multiple source some of them was discharged some of them open wound.    Hospice: I had a long conversation with the patient regarding end of life issues, palliative, comfort care as well as hospice. Explained that Palliative care services focuses on providing patients with relief from the symptoms. The goal is to improve quality of life for both the patient and the family. Palliative care focuses on symptoms such as pain, shortness of breath, fatigue, constipation, nausea, loss of appetite, difficulty sleeping and depression, etc. Hospice focuses on caring, not curing and in most cases care is provided in the patient's home. This is offer when patient's are not a candidate for aggressive chemotherapy or treatment or if patient's performance status is not acceptable for treatment. This service goes hand to hand with comfort care and is provided to patients that prognosis is expected to be <  6 months.          Past Medical History:   Diagnosis Date    AAA (abdominal aortic aneurysm)     Anxiety     Chronic obstructive lung disease     Depression     Diabetes mellitus, type 2     GERD (gastroesophageal reflux disease)     Hyperlipidemia     Hypertension     Lung mass     On home O2     2-4 L    Spinal stenosis        Past Surgical History:   Procedure Laterality Date    ANGIOPLASTY      BRAIN SURGERY      Trauma/ Subdural.    CATARACT EXTRACTION EXTRACAPSULAR W/ INTRAOCULAR LENS IMPLANTATION Left     LUNG BIOPSY Right 08/30/2022    Dr. Shaikh    NECK SURGERY      x3    right 2nd toe amputation       Social History     Socioeconomic History    Marital status:    Tobacco Use    Smoking status: Every Day     Packs/day: 0.25     Years: 50.00     Pack years: 12.50     Types: Cigarettes    Smokeless tobacco: Never    Tobacco comments:     Now smoking 1/2 PPD   Substance and Sexual Activity    Alcohol use: Not Currently    Drug use: Yes     Types: Marijuana     Comment: 1 joint a day    Sexual activity: Not Currently   Social History Narrative    ** Merged History Encounter **           Family History   Problem Relation Age of Onset    Cancer Mother     Heart disease Father       Review of patient's allergies indicates:   Allergen Reactions    Duloxetine Other (See Comments)     unknown      Review of Systems   Constitutional:  Positive for activity change, appetite change, fatigue and unexpected weight change. Negative for chills, diaphoresis and fever.   HENT:  Positive for postnasal drip. Negative for ear discharge, facial swelling, mouth sores, nosebleeds, sinus pressure/congestion and sore throat.         Excessive tearing to bilateral eyes    Eyes:  Negative for pain, discharge and visual disturbance.   Respiratory:  Positive for cough and shortness of breath. Negative for chest tightness and wheezing.    Cardiovascular:  Negative for chest pain, palpitations and leg swelling.   Gastrointestinal: Negative.  Negative for abdominal pain, blood in stool, change in bowel habit, constipation, diarrhea, nausea, vomiting and change in bowel habit.   Endocrine: Negative.    Genitourinary: Negative.  Negative for dysuria, frequency and urgency.   Musculoskeletal:  Positive for neck pain. Negative for arthralgias, back pain,  gait problem, joint swelling and myalgias.        Right rib pain. Weakness   Integumentary:  Positive for wound. Negative for color change, pallor and rash.   Allergic/Immunologic: Negative.  Negative for frequent infections.   Neurological:  Positive for weakness. Negative for dizziness, vertigo, syncope, light-headedness and numbness.   Hematological: Negative.  Negative for adenopathy. Does not bruise/bleed easily.   Psychiatric/Behavioral: Negative.  Negative for confusion and dysphoric mood. The patient is not nervous/anxious.    All other systems reviewed and are negative.      Objective:        Vitals:    07/18/23 0940   BP: 123/73   Pulse: 75   Resp: 18   Temp: 98 °F (36.7 °C)                   Physical Exam  Vitals reviewed.   Constitutional:       General: He is not in acute distress.     Appearance: He is ill-appearing.          Comments: Looks weak and deconditioned   HENT:      Head: Normocephalic and atraumatic.      Right Ear: External ear normal.      Left Ear: External ear normal.      Nose: Nose normal.      Mouth/Throat:      Mouth: Mucous membranes are moist.      Pharynx: Oropharynx is clear. No oropharyngeal exudate or posterior oropharyngeal erythema.   Eyes:      Extraocular Movements: Extraocular movements intact.      Conjunctiva/sclera: Conjunctivae normal.      Pupils: Pupils are equal, round, and reactive to light.      Comments: Excessive tearing to bilateral eyes    Cardiovascular:      Rate and Rhythm: Normal rate and regular rhythm.      Pulses: Normal pulses.      Heart sounds: Normal heart sounds. No murmur heard.    No friction rub. No gallop.   Pulmonary:      Effort: Pulmonary effort is normal. No respiratory distress.      Breath sounds: Decreased breath sounds present. No rales.   Chest:       Abdominal:      General: Abdomen is flat. Bowel sounds are normal. There is no distension.      Palpations: Abdomen is soft. There is no mass.      Tenderness: There is no abdominal  tenderness. There is no guarding.   Musculoskeletal:         General: No swelling, tenderness or deformity. Normal range of motion.      Cervical back: Normal range of motion and neck supple.      Right lower leg: No edema.      Left lower leg: No edema.      Comments: Walks with cane   Lymphadenopathy:      Cervical: No cervical adenopathy.   Skin:     General: Skin is warm and dry.      Findings: Lesion and wound present. No erythema or rash.             Comments: Multiple hyperpigmented lesions/wound all over his body. Some of them ulcerated, including ear lobe, bilateral arms and leg back and shoulder   Neurological:      Mental Status: He is oriented to person, place, and time.      Cranial Nerves: Cranial nerves 2-12 are intact.      Motor: Weakness present.      Gait: Gait abnormal (walks with cane).   Psychiatric:         Attention and Perception: He is attentive.         Mood and Affect: Mood normal. Affect is not flat.         Behavior: Behavior normal. Behavior is cooperative.         Thought Content: Thought content normal. Thought content does not include suicidal ideation.         Cognition and Memory: He does not exhibit impaired recent memory.         Judgment: Judgment normal.         ECOG SCORE             LABS AND IMAGING REVIEWED IN EPIC        CT chest 10/4/21: Interim development of 1 cm subpleural nodule lateral right lower lobe. 4 mm nodule lateral right middle lobe    PET 1/5/2022: Right middle lobe nodule demonstrates no significant FDG activity and is stable in size     CT Chest 7/18/2022: Interim increase in size of prior 1 cm right middle lobe subpleural nodule now measuring 2.2 cm.  Interim increase in size of prior 4 mm subpleural nodule lateral right middle lobe now measuring 6 mm. Pinpoint 2 mm subpleural nodules at the left upper and left lower lung. Partial thickening of the left lateral major fissure with the atelectasis and/or scarring at the left lingular segment.    PET 9/8/22:  RML hypermetabolc nodule. 2 smaller lesions with no avid activity with larger measure 0.9 x 0.7 cm. Mild uptake with SUV at 3.0 & measure 1.8 x 1.0 cm in right hilum worrisome for metastatic disease.     CXR 6/21/23: Impression:   1. Interim developing infiltrate and atelectasis at the right lower lung since the prior exam  2. Atherosclerosis  3. The oncology floor was notified of the findings on 06/21/2023 at 15:15.     CT CAP 6/28/23 With Contrast: Impression:   1. Interim increase in subcentimeter pulmonary nodules at the lungs bilaterally since the prior exam  2. Patchy wedge-shaped 0 opacity again evident at the right lateral mid lung with scattered patchy hazy opacities again evident at the superior segment of the right lower lobe  3. Bilateral perinephric stranding  4. Findings of constipation  5. Extensive atherosclerosis bladder mucosal thickening versus underdistention with borderline prostatomegaly  6. Gastric mucosal prominence versus underdistention    Assessment:     1. Adenocarcinoma, lung, right    2. Malignant neoplasm metastatic to both lungs      NSCLC / Adenocarcinoma Stage IIB or IIIA as can be T1c (2.2cm) or T3 (separate tumor nodules in RML, though not avid) N1 M0   - PD-L1: 85%. Negative for ALK, BRAF, EGFR, ROS1, TrkA/B/C. MSI-S/MMR-P .   - CT chest 10/4/21: 1 cm subpleural RLL nodule. 4 mm lateral RML nodule .   - PET 1/5/2022: RML nodule demonstrates no significant FDG activity and is stable in size   - CT Chest 7/18/2022: Interim increase in size of prior 1 cm RML subpleural nodule now measuring 2.2 cm.  Interim increase in size of prior 4 mm subpleural nodule lateral RML lobe now measuring 6 mm. Pinpoint 2 mm subpleural nodules at the left upper and left lower lung. Partial thickening of the left lateral major fissure with the atelectasis and/or scarring at the left lingular segment.  - IR guided biopsy on 8/30/2022 with pathology showing pulmonary ADC.   - PET 9/8/22: RML hypermetabolc  nodule. 2 smaller lesions with no avid activity with larger measure 0.9 x 0.7 cm. Mild uptake with SUV at 3.0 & measure 1.8 x 1.0 cm in right hilum worrisome for metastatic disease.   - MRI Brain 9/7/22: no evidence of metastatic disease. Prior right occipitoparietal craniotomy with localized right temporal lobe encephalomalacia.   - Based on Stage IIB or IIIA with COPD on home oxygen, not a candidate for surgery, but will benefit from C-XRT.   - XRT to right Lung/Hilum Started 9/19/22 - ended 11-2-22.  Carbo/Alimta q3w with radiation therapy (9/19/22 - 11/2/2022)  Completed 4 cycles of Carbo/Alimta q3w on 12/13/2023  Was due to begin Durvalumab in 2/2023 but needed cataract surgery first. Alternatively started 4/26/23 4/20/23 CT CAP: LUNGS/PLEURA: Right middle lobe nodule is partially obscured by surrounding ground-glass opacities.  Nodule estimated to measure 2.1 x 2 cm (3, 74); previously 2.5 x 2.2 cm at a similar location.  There are new ground-glass opacities in the right middle lobe, basilar right upper lobe and superior segment of the right lower lobe.  Scattered tiny pulmonary nodules at the mid to basilar right lung are indeterminate.  For example there is a 5-6 mm nodule in the right lower lobe (3, 68) which is new compared to previous exam.  No pleural effusion or pneumothorax.      Chemotherapy  Completed Carboplatin/pemetrexed q3w x4 cycles with concurrent XRT to right lung and hilum  Immunotherapy with Durvalumab 1500mg q4w started on 4/26/23 now that his cataract surgery is complete.  Due today for cycle 3 Durvalumab 1500mg q4w. Due to the patient's weakness, sob and cough we will hold his treatment today.  Imaging  Last CT completed 04/20/2023.  Due to his weight loss, increasing SOB and weakness, we will obtain a restaging CT asap.     Palliative care:   Counseled on smoking cessation, nicotine patches prescribed   c/w magic mouthwash     Hyperkalemia:  resolved     Decrease appetite  nutrition  consult  Ramez Vang referral for supplements   Continues to lose weight.  He does not like nutritional shakes.  He was encouraged to eat small frequent meals we will continue to monitor at this point.  Prescribed megace today.    Pain  Right rib   Pain is currently controlled with pain medication   Hypomagnesia   Resolved     Vision  Improved following bilateral cataract surgery.    Generalized weakness  Overall deconditioning most likely secondary to chemotherapy, radiation, and cancer diagnosis.  Does currently walk with a cane.  Hx of falls.  Currently being followed by HH.  He was instructed to ask HH for PT evaluation.  We will send a referral if needed.    Elevated creatinine/Dehydration  Encouraged hydration.   We will give 1 L of NS today.     Skin Lesions  Multiple dark skin lesions on multiple parts of body.  Followed by Dermatology, Jayden Ballard.  Continue Doxycycline x 90 days  SOB/Cough         -CXR obtained today indicates pneumonia as per radiologist. We will start him on Amoxicillin. He was instructed to continue doxycycline.    14.  Vitamin D deficiency               Will start him on 1000IUs daily and 50,000 IU once weekly.     15. Dizziness : Informed patient to discuss with PCP regarding BP med's        Plan:     Will d/c durvalumab.  - Patient with metastatic disease to the lungs. Next step will be chemotherapy but he is not sure if he will like to proceed as he did not tolerate chemo in the past.   Continue follow up with Dermatology.    Return to clinic in 1 month with MD for follow up and treatment discussion/lab   CBC CMP     The patient was seen, interviewed and examined. Pertinent lab and radiology studies were reviewed.   The patient was given ample opportunity to ask questions, and to the best of my abilities, all questions answered to satisfaction; patient demonstrated understanding of what we discussed and agreeable to the plan. Pt instructed to call should develop concerning  signs/symptoms or have further questions.     Snow Kam MD  Hematology/Oncology  [facility]

## 2023-07-18 PROBLEM — C78.01 MALIGNANT NEOPLASM METASTATIC TO BOTH LUNGS: Status: ACTIVE | Noted: 2023-01-01

## 2023-07-18 PROBLEM — C78.02 MALIGNANT NEOPLASM METASTATIC TO BOTH LUNGS: Status: ACTIVE | Noted: 2023-01-01
